# Patient Record
Sex: FEMALE | Race: OTHER | HISPANIC OR LATINO
[De-identification: names, ages, dates, MRNs, and addresses within clinical notes are randomized per-mention and may not be internally consistent; named-entity substitution may affect disease eponyms.]

---

## 2019-11-20 ENCOUNTER — RESULT REVIEW (OUTPATIENT)
Age: 44
End: 2019-11-20

## 2019-12-30 ENCOUNTER — RESULT REVIEW (OUTPATIENT)
Age: 44
End: 2019-12-30

## 2020-03-17 PROBLEM — Z00.00 ENCOUNTER FOR PREVENTIVE HEALTH EXAMINATION: Status: ACTIVE | Noted: 2020-03-17

## 2020-04-14 ENCOUNTER — APPOINTMENT (OUTPATIENT)
Dept: ORTHOPEDIC SURGERY | Facility: CLINIC | Age: 45
End: 2020-04-14

## 2020-06-05 ENCOUNTER — APPOINTMENT (OUTPATIENT)
Dept: ORTHOPEDIC SURGERY | Facility: CLINIC | Age: 45
End: 2020-06-05
Payer: MEDICAID

## 2020-06-05 DIAGNOSIS — M25.552 PAIN IN LEFT HIP: ICD-10-CM

## 2020-06-05 DIAGNOSIS — G89.29 PAIN IN LEFT HIP: ICD-10-CM

## 2020-06-05 DIAGNOSIS — M54.5 LOW BACK PAIN: ICD-10-CM

## 2020-06-05 DIAGNOSIS — Z87.09 PERSONAL HISTORY OF OTHER DISEASES OF THE RESPIRATORY SYSTEM: ICD-10-CM

## 2020-06-05 DIAGNOSIS — G89.29 LOW BACK PAIN: ICD-10-CM

## 2020-06-05 PROCEDURE — 72170 X-RAY EXAM OF PELVIS: CPT

## 2020-06-05 PROCEDURE — 72100 X-RAY EXAM L-S SPINE 2/3 VWS: CPT

## 2020-06-05 PROCEDURE — 99204 OFFICE O/P NEW MOD 45 MIN: CPT

## 2020-06-05 PROCEDURE — 73560 X-RAY EXAM OF KNEE 1 OR 2: CPT | Mod: LT

## 2020-06-05 NOTE — HISTORY OF PRESENT ILLNESS
[de-identified] : Patient is here for left leg pain that began about a year ago without inciting injury. She has experienced falls in the past. The pain goes from the knee to her calf. She has noticed weakness in her legs. She has done nothing to treat this pain. She states that she will occasionally have numbness in the leg. \par \par The patient's past medical history, past surgical history, medications and allergies were reviewed by me today and documented accordingly. In addition, the patient's family and social history, which were noncontributory to this visit, were reviewed also. Intake form was reviewed. The patient has no family history of arthritis.

## 2020-06-05 NOTE — DISCUSSION/SUMMARY
[de-identified] : Patient was seen today for evaluation management of 1+ year of chronic left lower extremity pain.  She has pain from the left side of her back into the left hip, left thigh, and most severely in the left knee.  X-ray evaluation shows that patient has mild osteoarthritis of the lumbar spine, of the left hip, but has severe osteoarthritis in left knee with valgus deformity.  This is the primary etiology of her pain and discomfort leading to abnormal gait and pain going up her kinetic chain.  We discussed various treatment options.  Patient will be started on a course of physical therapy to restore normal range of motion and strength as tolerated.  Patient does not wish to proceed with any oral medications or any injection therapy at this time.  Follow up as needed.  Patient appreciates and agrees with current plan.\par \par This note was generated using dragon medical dictation software.  A reasonable effort has been made for proofreading its contents, but typos may still remain.  If there are any questions or points of clarification needed please notify my office.

## 2020-06-05 NOTE — PHYSICAL EXAM
[de-identified] : Constitutional: Well-nourished, well-developed, No acute distress, obese\par Respiratory:  Good respiratory effort, no SOB\par Lymphatic: No regional lymphadenopathy, no lymphedema\par Psychiatric: Pleasant and normal affect, alert and oriented x3\par Skin: Clean dry and intact B/L UE/LE\par Musculoskeletal: normal except where as noted in regional exam\par \par Lumbar Spine Exam\par \par APPEARANCE: no marked deformities or malalignment, normal curvature of the lumbosacral spine. no marked deformities. good posture\par POSITIVE TENDERNESS: + Left lumbar tenderness and spasm noted in erector spinae and quadratus lumborum\par NONTENDER: no bony midline tenderness, no marked tenderness in right lumbar musculature.\par ROM: full, although painful at end range of flexion and extension\par RESISTIVE TESTING: painless 5/5 resisted flex/ext, sidebending b/l, and rotation\par SPECIAL TESTS: neg SLR b/l, neg MARLEN b/l, neg Trendelenburg b/l \par PULSES: 2+ DP/PT pulses\par NEURO:  L1 - S2 intact to sensation and motor, DTRs 2+/4 patella and achilles\par \par Right hip:\par \par APPEARANCE: no marked deformities, no swelling or malalignment\par POSITIVE TENDERNESS: none\par NONTENDER: greater trochanter, TFL, gluteal region, ischium/proximal hamstring region, hip flexor region, sartorius and pubic symphysis. \par ROM: full & painless. \par RESISTIVE TESTING: painless resisted flex/ext, ER/IR/SLR/abduction/adduction. \par SPECIAL TESTS: neg MARLEN/FADIR, painless loaded flexion & scouring\par PULSES: 2+ DP/PT pulses\par Neuro:  NL sensation of thigh and lower extremity, DTRs 2+/4 patella and achilles\par \par Right knee:\par APPEARANCE: no swelling, no marked deformities or malalignment\par POSITIVE TENDERNESS:  none\par NONTENDER: jt lines b/l & retinacula b/l, patellar & quadriceps tendons, MCL/LCL, ITB at the lateral femoral condyle & Gerdy's tubercle, pes bursa. \par ROM: full & painless. \par RESISTIVE TESTING: painless resisted knee flex/ext. \par SPECIAL TESTS: stable v/v stress. painless grind. neg Lachman's. neg ant/post drawer. neg Sharon's. neg Thessaly test. neg Naida's & Malacrae's\par NEURO: Normal sensation of LE, DTRs 2+/4 patella and achilles\par \par Left hip:\par \par APPEARANCE: no marked deformities, no swelling or malalignment\par POSITIVE TENDERNESS: Hip flexor region, sartorius, proximal rectus femoris\par NONTENDER: greater trochanter, TFL, gluteal region, ischium/proximal hamstring region, and pubic symphysis. \par ROM: Limited in all planes due to pain. \par RESISTIVE TESTING: MMT 4+/5 and mild pain with hip flexion, painless resisted ER/IR/SLR/abduction/adduction. \par SPECIAL TESTS: + MARLEN/FADIR, moderate pain with loaded flexion & scouring, + Trendelenburg\par NEURO: Normal sensation of LE, DTRs 2+/4 patella and achilles\par PULSES: 2+ DP/PT pulses\par \par Left knee:\par APPEARANCE: + enlargement of the distal femur and proximal tibia, valgus deformity, no swelling\par POSITIVE TENDERNESS:  Distal femur, proximal tibia, medial jt line/retinaculum, and lateral jt line/retinaculum\par NONTENDER: patellar & quadriceps tendons, MCL/LCL, ITB at the lateral femoral condyle & Gerdy's tubercle, pes bursa. \par ROM: full extension, limited flexion to 100° due to stiffness and pain. \par RESISTIVE TESTING: painless resisted knee flex/ext, although + crepitus felt in anterior knee. \par SPECIAL TESTS: stable v/v stress. painless grind. neg ant/post drawer. + Sharon's for pain in medial and lateral joint line. \par NEURO: Normal sensation of LE, DTRs 2+/4 patella and achilles\par PULSES: 2+ DP/PT pulses [de-identified] : The following radiographs were ordered and read by me during this patient's visit. I reviewed each radiograph in detail with the patient and discussed the findings as highlighted below. \par \par 2 views of the lumbar spine were obtained today that show degenerative changes and evidence of mild osteoarthritis in the L4-L5 and L5-S1 levels.  No fracture, or dislocation seen at this time. There is no malalignment. No other obvious osseous abnormality. Otherwise unremarkable.\par \par 1 views of the AP pelvis were obtained today that show degenerative changes and evidence of mild osteoarthritis in the bilateral femoral acetabular joints.  No fracture, or dislocation seen at this time. There is no malalignment. No other obvious osseous abnormality. Otherwise unremarkable.\par \par 1 views of the AP knees were obtained today that show degenerative changes and evidence of moderate right and severe left knee osteoarthritis with bone-on-bone contact of the lateral compartment.  No fracture, or dislocation seen at this time. There is no malalignment. No other obvious osseous abnormality. Otherwise unremarkable.

## 2020-09-04 ENCOUNTER — APPOINTMENT (OUTPATIENT)
Dept: ORTHOPEDIC SURGERY | Facility: CLINIC | Age: 45
End: 2020-09-04
Payer: MEDICAID

## 2020-09-04 PROCEDURE — 99213 OFFICE O/P EST LOW 20 MIN: CPT

## 2020-09-04 RX ORDER — HYALURONATE SODIUM, STABILIZED 88 MG/4 ML
88 SYRINGE (ML) INTRAARTICULAR
Qty: 1 | Refills: 0 | Status: ACTIVE | COMMUNITY
Start: 2020-09-04

## 2020-09-04 NOTE — HISTORY OF PRESENT ILLNESS
[de-identified] : Patient is here for left leg pain follow up. She states that she has attended PT but has not noticed improvement. There has been no recent injury. She is taking Tylenol for pain relief.

## 2020-09-04 NOTE — REASON FOR VISIT
[Follow-Up Visit] : a follow-up visit for [Family Member] : family member [FreeTextEntry2] : left leg pain

## 2020-09-04 NOTE — DISCUSSION/SUMMARY
[de-identified] : Patient was seen today for reevaluation and continue management of left knee pain secondary to severe osteoarthritis with bone-on-bone contact of the lateral compartment.  We discussed various treatment options as well as associated risk/benefits/alternatives and patient elected to proceed with conservative management.  Patient would like to proceed with insurance authorization for hyaluronic acid injection therapy at this has the most potential for long-term benefit.  Patient is recommended to start a trial of aquatic therapy, she did not have any response or pain relief to regular table based physical therapy.  Patient appreciates and agrees with current plan.\par \par This note was generated using dragon medical dictation software.  A reasonable effort has been made for proofreading its contents, but typos may still remain.  If there are any questions or points of clarification needed please notify my office.

## 2020-09-04 NOTE — PHYSICAL EXAM
[de-identified] : Constitutional: Well-nourished, well-developed, No acute distress, obese\par Respiratory:  Good respiratory effort, no SOB\par Lymphatic: No regional lymphadenopathy, no lymphedema\par Psychiatric: Pleasant and normal affect, alert and oriented x3\par Skin: Clean dry and intact B/L UE/LE\par Musculoskeletal: normal except where as noted in regional exam\par \par Left knee:\par APPEARANCE: + enlargement of the distal femur and proximal tibia, valgus deformity, no swelling\par POSITIVE TENDERNESS:  Distal femur, proximal tibia, medial jt line/retinaculum, and lateral jt line/retinaculum\par NONTENDER: patellar & quadriceps tendons, MCL/LCL, ITB at the lateral femoral condyle & Gerdy's tubercle, pes bursa. \par ROM: full extension, limited flexion to 100° due to stiffness and pain. \par RESISTIVE TESTING: painless resisted knee flex/ext, although + crepitus felt in anterior knee. \par SPECIAL TESTS: stable v/v stress. painless grind. neg ant/post drawer. + Sharon's for pain in medial and lateral joint line. \par NEURO: Normal sensation of LE\par PULSES: 2+ DP/PT pulses

## 2021-01-18 ENCOUNTER — RESULT REVIEW (OUTPATIENT)
Age: 46
End: 2021-01-18

## 2021-01-20 RX ORDER — HYALURONATE SODIUM, STABILIZED 88 MG/4 ML
88 SYRINGE (ML) INTRAARTICULAR
Qty: 1 | Refills: 0 | Status: ACTIVE | COMMUNITY
Start: 2021-01-20

## 2021-03-03 ENCOUNTER — RESULT REVIEW (OUTPATIENT)
Age: 46
End: 2021-03-03

## 2021-03-05 ENCOUNTER — APPOINTMENT (OUTPATIENT)
Dept: ORTHOPEDIC SURGERY | Facility: CLINIC | Age: 46
End: 2021-03-05

## 2021-03-12 ENCOUNTER — APPOINTMENT (OUTPATIENT)
Dept: ORTHOPEDIC SURGERY | Facility: CLINIC | Age: 46
End: 2021-03-12
Payer: MEDICAID

## 2021-03-12 VITALS — HEIGHT: 66 IN | WEIGHT: 293 LBS | BODY MASS INDEX: 47.09 KG/M2

## 2021-03-12 PROCEDURE — 99072 ADDL SUPL MATRL&STAF TM PHE: CPT

## 2021-03-12 PROCEDURE — 99213 OFFICE O/P EST LOW 20 MIN: CPT

## 2021-03-12 NOTE — PHYSICAL EXAM
[de-identified] : Constitutional: Well-nourished, well-developed, No acute distress, obese\par Respiratory:  Good respiratory effort, no SOB\par Lymphatic: No regional lymphadenopathy, no lymphedema\par Psychiatric: Pleasant and normal affect, alert and oriented x3\par Skin: Clean dry and intact B/L UE/LE\par Musculoskeletal: normal except where as noted in regional exam\par \par Left knee:\par APPEARANCE: + enlargement of the distal femur and proximal tibia, valgus deformity, no swelling\par POSITIVE TENDERNESS:  Distal femur, proximal tibia, medial jt line/retinaculum, and lateral jt line/retinaculum\par NONTENDER: patellar & quadriceps tendons, MCL/LCL, ITB at the lateral femoral condyle & Gerdy's tubercle, pes bursa. \par ROM: full extension, limited flexion to 100° due to stiffness and pain. \par RESISTIVE TESTING: painless resisted knee flex/ext, although + crepitus felt in anterior knee. \par SPECIAL TESTS: stable v/v stress. painless grind. neg ant/post drawer. + Sharon's for pain in medial and lateral joint line. \par

## 2021-03-12 NOTE — HISTORY OF PRESENT ILLNESS
[de-identified] : Patient is here for left knee pain follow up. She has continued having pain. She has attended PT. There has been no recent injury. She was denied insurance coverage of HA.

## 2021-03-24 ENCOUNTER — APPOINTMENT (OUTPATIENT)
Dept: ORTHOPEDIC SURGERY | Facility: CLINIC | Age: 46
End: 2021-03-24
Payer: MEDICAID

## 2021-03-24 VITALS — WEIGHT: 293 LBS | HEIGHT: 66 IN | BODY MASS INDEX: 47.09 KG/M2

## 2021-03-24 PROCEDURE — 99203 OFFICE O/P NEW LOW 30 MIN: CPT | Mod: 25

## 2021-03-24 PROCEDURE — 99072 ADDL SUPL MATRL&STAF TM PHE: CPT

## 2021-03-24 PROCEDURE — 20610 DRAIN/INJ JOINT/BURSA W/O US: CPT | Mod: LT

## 2021-03-24 PROCEDURE — 73562 X-RAY EXAM OF KNEE 3: CPT | Mod: LT

## 2021-04-30 ENCOUNTER — APPOINTMENT (OUTPATIENT)
Dept: ORTHOPEDIC SURGERY | Facility: CLINIC | Age: 46
End: 2021-04-30
Payer: MEDICAID

## 2021-04-30 PROCEDURE — 99072 ADDL SUPL MATRL&STAF TM PHE: CPT

## 2021-04-30 PROCEDURE — 99213 OFFICE O/P EST LOW 20 MIN: CPT

## 2021-04-30 RX ORDER — MELOXICAM 7.5 MG/1
7.5 TABLET ORAL
Qty: 14 | Refills: 0 | Status: ACTIVE | COMMUNITY
Start: 2021-04-30 | End: 1900-01-01

## 2021-04-30 NOTE — PHYSICAL EXAM
[de-identified] : Constitutional: Well-nourished, well-developed, No acute distress, obese\par Respiratory:  Good respiratory effort, no SOB\par Lymphatic: No regional lymphadenopathy, no lymphedema\par Psychiatric: Pleasant and normal affect, alert and oriented x3\par Musculoskeletal: normal except where as noted in regional exam\par \par Right knee:\par APPEARANCE: Mild swelling, no marked deformities or malalignment\par POSITIVE TENDERNESS:  + crepitus of the anterior knee, and tenderness of patellar retinaculum\par NONTENDER: jt lines b/l, patellar & quadriceps tendons, MCL/LCL, ITB at the lateral femoral condyle & Gerdy's tubercle, pes bursa. \par ROM: full Extension, pain with knee flexion, flexion limited to 115 degrees due to stiffness, pain and body habitus\par RESISTIVE TESTING: + discomfort with knee ext from deep knee flexion (stretched position), painless knee flexion. \par SPECIAL TESTS: stable v/v stress. painless grind. neg Lachman's. neg ant/post drawer. neg Sharon's. neg Thessaly test. neg Naida's & Malacrae's\par

## 2021-07-06 ENCOUNTER — APPOINTMENT (OUTPATIENT)
Dept: ORTHOPEDIC SURGERY | Facility: CLINIC | Age: 46
End: 2021-07-06

## 2021-07-06 ENCOUNTER — APPOINTMENT (OUTPATIENT)
Dept: ORTHOPEDIC SURGERY | Facility: CLINIC | Age: 46
End: 2021-07-06
Payer: MEDICAID

## 2021-07-06 PROCEDURE — 73502 X-RAY EXAM HIP UNI 2-3 VIEWS: CPT | Mod: RT

## 2021-07-06 PROCEDURE — 73562 X-RAY EXAM OF KNEE 3: CPT | Mod: LT

## 2021-07-06 PROCEDURE — 20610 DRAIN/INJ JOINT/BURSA W/O US: CPT | Mod: LT

## 2021-07-06 PROCEDURE — 99214 OFFICE O/P EST MOD 30 MIN: CPT | Mod: 25

## 2021-09-14 ENCOUNTER — APPOINTMENT (OUTPATIENT)
Dept: ORTHOPEDIC SURGERY | Facility: CLINIC | Age: 46
End: 2021-09-14
Payer: MEDICAID

## 2021-09-14 VITALS
HEIGHT: 66 IN | BODY MASS INDEX: 47.09 KG/M2 | WEIGHT: 293 LBS | DIASTOLIC BLOOD PRESSURE: 80 MMHG | SYSTOLIC BLOOD PRESSURE: 135 MMHG | OXYGEN SATURATION: 95 % | HEART RATE: 79 BPM

## 2021-09-14 PROCEDURE — 20610 DRAIN/INJ JOINT/BURSA W/O US: CPT | Mod: LT

## 2021-09-14 PROCEDURE — 99214 OFFICE O/P EST MOD 30 MIN: CPT | Mod: 25

## 2021-09-14 NOTE — PHYSICAL EXAM
[Antalgic] : antalgic [Normal RUE] : Right Upper Extremity: No scars, rashes, lesions, ulcers, skin intact [Normal Finger/nose] : finger to nose coordination [Normal Touch] : sensation intact for touch [Obese] : obese [Normal] : no peripheral adenopathy appreciated [Poor Appearance] : well-appearing [Acute Distress] : not in acute distress [de-identified] : Patient appears stated age in no acute respiratory distress. Patient is alert oriented x3. Patient has normal mood and affect. \par Right knee exam\par Range of motion of the knee is 0-120°. \par Skin is normal.  No rash.\par There is no effusion. No medial or lateral joint line tenderness. No swelling, no pitting edema.\par Overall alignment of the knee is then slight varus. Good anterior posterior stability. Firm endpoints on anterior and posterior drawer. \par Medial lateral stability is intact. Firm endpoint on medial and lateral stress testing .\par Sharon test is negative.\par Quadriceps strength 5/ 5. There is no loss of muscle volume in the thigh. \par Good anterior posterior and mediolateral stability.\par Sensation in the extremities intact. \par Discrimination is intact. Good DP and PT pulses.\par 		\par \par Bilateral hip exam\par On inspection of the hip shows skin is normal. No evidence of rash. \par No loss of muscle.  Abductor strength is 5 out of 5. Hip flexor strength is 5.\par Range of motion of the hip at 90° flexion internal rotation is 15° external rotation is 30° pain-free. \par Hip has good stability in anterior and posterior direction. \par On lateral decubitus  examination there is no tenderness in the greater trochanter. \par Lower Extremity Examination \par Bilateral lower extremity skin is normal. There is no rash. There is no edema and lymphadenopathy.  DP and PT pulses intact. Sensation is intact.\par \par Left knee exam\par There is minimal to moderate effusion. Range of motion is 0-120°. Painful at the extremes of range of motion. \par There is medial and lateral joint line tenderness. \par Quadriceps strength is 4/5. There is some muscle loss in the quadriceps. \par Anteroposterior and mediolateral stability is intact Sharon test is negative. Alignment of the knee is in 5° of varus. [de-identified] : X-rays of the left knee 3 view shows advanced degenerative bone-on-bone arthritis joint destruction osteophyte formation\par \par X-rays of the right hip 2 view shows mild arthritis
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
## 2021-09-14 NOTE — DISCUSSION/SUMMARY
[Surgical risks reviewed] : Surgical risks reviewed [de-identified] : Left knee arthritis bone-on-bone arthritis joint destruction osteophyte formation\par At this point patient has tried all conservative treatment options including weight loss NSAIDs. Patient also has had multiple injections in the knee. This has not helped the patient. Patient is considering surgical treatment options. All the pros and cons of operative versus nonoperative treatment discussed with the patient. All of the potential risks including possible infection possible bleeding possible need for repeat surgery possible medical complications possible blood clots discussed with the patient. Patient completely understands the risks and benefits.\par The risks of re-admission discussed with the patient as well. Also the need for anticoagulation discussed with the patient. Patient prefers to take aspirin 325 twice a day.\par Patient completely understands the risks and benefits and wants to undergo the procedure.\par I reviewed the plan of care as well as used a model of a total knee implant equivalent to the one that will be used for their total knee  joint replacement. The ability to secure the implant utilizing cement or cementless (press-fit) was discussed with the patient. The patient agrees with the plan of care, as well as the use of implants for their total joint replacement.\par The patient is advised to get medical clearance. \par Patient is also advised to attend a joint replacement education class.\par \par For now we will try conservative treatment NSAIDs therapy she is considering left knee injection\par The risks and benefits of injection was discussed with the patient. Verbal consent was obtained from the patient. The area was prepped with Betadine. A lateral suprapatellar approach was used. The knee was injected with 2 cc of Depo-Medrol 2 cc of lidocaine.  The procedure well. Band-Aid was applied.

## 2022-04-26 ENCOUNTER — APPOINTMENT (OUTPATIENT)
Dept: ORTHOPEDIC SURGERY | Facility: CLINIC | Age: 47
End: 2022-04-26

## 2022-06-17 ENCOUNTER — APPOINTMENT (OUTPATIENT)
Dept: ORTHOPEDIC SURGERY | Facility: CLINIC | Age: 47
End: 2022-06-17
Payer: MEDICAID

## 2022-06-17 VITALS — WEIGHT: 293 LBS | BODY MASS INDEX: 47.09 KG/M2 | HEIGHT: 66 IN

## 2022-06-17 DIAGNOSIS — M76.822 POSTERIOR TIBIAL TENDINITIS, LEFT LEG: ICD-10-CM

## 2022-06-17 PROCEDURE — 73630 X-RAY EXAM OF FOOT: CPT | Mod: LT

## 2022-06-17 PROCEDURE — 99214 OFFICE O/P EST MOD 30 MIN: CPT

## 2022-06-17 PROCEDURE — 73564 X-RAY EXAM KNEE 4 OR MORE: CPT | Mod: LT

## 2022-06-17 NOTE — PHYSICAL EXAM
[de-identified] : Right Lower Extremity\par o Knee :\par ¦ Inspection/Palpation : no tenderness to palpation, no swelling, no deformity\par ¦ Range of Motion : 0 - 110 degrees, no crepitus\par ¦ Stability : no valgus or varus instability present on provocative testing, Lachman’s Test (-)\par ¦ Strength : flexion and extension 5/5\par o Muscle Bulk : normal muscle bulk present\par o Skin : no erythema, no ecchymosis\par o Sensation : sensation to pin intact\par o Vascular Exam : no edema, no cyanosis, dorsalis pedis artery pulse 2+, posterior tibial artery pulse 2+\par \par Left Lower Extremity\par o Knee :\par ¦ Inspection/Palpation : no tenderness to palpation, no swelling, valgus alignment\par ¦ Range of Motion : 0 -100 degrees, no crepitus\par ¦ Stability : no valgus or varus instability present on provocative testing, Lachman’s Test (-)\par ¦ Strength : flexion and extension 5/5\par o Muscle Bulk : normal muscle bulk present\par o Skin : no erythema, no ecchymosis\par o Sensation : sensation to pin intact\par o Vascular Exam : no edema, no cyanosis, dorsalis pedis artery pulse 2+, posterior tibial artery pulse 2+ \par \par o Ankle :\par ¦ Inspection/Palpation : tenderness to palpation distal posterior tibialis, tenderness over the navicular, no swelling, no deformities\par ¦ Range of Motion : arc of motion full and painless in all planes\par ¦ Stability : no joint instability on provocative testing\par ¦ Strength : all muscles 5/5\par o Muscle Bulk : no atrophy\par o Sensation : sensation intact to light touch\par o Skin : no skin lesions, no discoloration\par o Vascular Exam : no edema, no cyanosis,  dorsalis pedis artery pulse 2+, posterior tibial artery pulse 2+  [de-identified] : \par \par o Left Knee : AP, lateral, sunrise, and Souza views of the knee were obtained, there are no soft tissue abnormalities, no fractures, valgus alignment, severe osteoarthritis with bone on bone apposition in the lateral compartment, normal bone density, no bony lesions. \par \par o Left Foot: AP, lateral and oblique views of the foot were obtained, there are no soft tissue abnormalities, no fractures, alignment is normal, normal appearing joint spaces, mild spurring dorsal aspect of the talonavicular joint, normal bone density, no bony lesions.
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
## 2022-06-17 NOTE — DISCUSSION/SUMMARY
[de-identified] : The underlying pathophysiology was reviewed in great detail with the patient as well as the various treatment options, including ice, analgesics, NSAIDs, Physical therapy, steroid injections.\par \par I recommended a course of physical therapy for the bilateral knees to improve ROM and strength. A script was provided today. \par \par Activity modifications and restrictions were discussed. I advised avoiding deep bending, squatting and high intensity activity.\par \par A home exercise sheet was given and discussed with the patient to follow. \par \par I have recommended utilizing a knee sleeve to provide added support and stability. \par \par All questions were answered, all alternatives discussed and the patient is in complete agreement with that plan. Follow-up appointment as instructed. Any issues and the patient will call or come in sooner.

## 2022-08-24 NOTE — DISCUSSION/SUMMARY
[de-identified] : Discussed findings of today's exam and possible causes of patient's pain.  Educated patient on their most probable diagnosis of atraumatic right knee pain due to exacerbation of underlying osteoarthritis.  Reviewed possible courses of treatment, and we collaboratively decided best course of treatment at this time will include conservative management.  Patient was seen today for evaluation of 1 day of right knee pain without any trauma, she has some mild swelling, but no significant effusion.  Patient likely has exacerbation of underlying osteoarthritis.  Patient started on a course of oral NSAIDs, prescription given for Mobic (We discussed all possible side effects of this medication).  Patient is already undergoing a course of physical therapy for her left knee, recommend addition of this right knee to that course of PT.  If no improvement in the next couple of weeks may consider cortisone injection at that time.  Follow up as needed.  Patient appreciates and agrees with current plan.\par \par This note was generated using dragon medical dictation software.  A reasonable effort has been made for proofreading its contents, but typos may still remain.  If there are any questions or points of clarification needed please notify my office.\par 
54

## 2022-11-28 ENCOUNTER — RESULT REVIEW (OUTPATIENT)
Age: 47
End: 2022-11-28

## 2023-05-09 ENCOUNTER — APPOINTMENT (OUTPATIENT)
Dept: ORTHOPEDIC SURGERY | Facility: CLINIC | Age: 48
End: 2023-05-09
Payer: MEDICAID

## 2023-05-09 ENCOUNTER — APPOINTMENT (OUTPATIENT)
Dept: ORTHOPEDIC SURGERY | Facility: CLINIC | Age: 48
End: 2023-05-09

## 2023-05-09 VITALS — HEIGHT: 67 IN | BODY MASS INDEX: 45.36 KG/M2 | WEIGHT: 289 LBS

## 2023-05-09 DIAGNOSIS — G40.909 EPILEPSY, UNSPECIFIED, NOT INTRACTABLE, W/OUT STATUS EPILEPTICUS: ICD-10-CM

## 2023-05-09 DIAGNOSIS — M79.672 PAIN IN LEFT FOOT: ICD-10-CM

## 2023-05-09 PROCEDURE — 73564 X-RAY EXAM KNEE 4 OR MORE: CPT | Mod: LT

## 2023-05-09 PROCEDURE — 73630 X-RAY EXAM OF FOOT: CPT | Mod: LT

## 2023-05-09 PROCEDURE — 99214 OFFICE O/P EST MOD 30 MIN: CPT

## 2023-10-03 ENCOUNTER — APPOINTMENT (OUTPATIENT)
Dept: ORTHOPEDIC SURGERY | Facility: CLINIC | Age: 48
End: 2023-10-03

## 2023-11-28 ENCOUNTER — APPOINTMENT (OUTPATIENT)
Dept: ORTHOPEDIC SURGERY | Facility: CLINIC | Age: 48
End: 2023-11-28
Payer: MEDICAID

## 2023-11-28 VITALS — HEIGHT: 67 IN | WEIGHT: 289 LBS | BODY MASS INDEX: 45.36 KG/M2

## 2023-11-28 PROCEDURE — 99214 OFFICE O/P EST MOD 30 MIN: CPT | Mod: 25

## 2023-11-28 PROCEDURE — 20610 DRAIN/INJ JOINT/BURSA W/O US: CPT | Mod: LT

## 2024-01-26 ENCOUNTER — APPOINTMENT (OUTPATIENT)
Dept: ORTHOPEDIC SURGERY | Facility: CLINIC | Age: 49
End: 2024-01-26

## 2024-03-06 ENCOUNTER — APPOINTMENT (OUTPATIENT)
Dept: ORTHOPEDIC SURGERY | Facility: CLINIC | Age: 49
End: 2024-03-06
Payer: MEDICAID

## 2024-03-06 DIAGNOSIS — M17.11 UNILATERAL PRIMARY OSTEOARTHRITIS, RIGHT KNEE: ICD-10-CM

## 2024-03-06 PROCEDURE — 99214 OFFICE O/P EST MOD 30 MIN: CPT | Mod: 25

## 2024-03-06 PROCEDURE — 73564 X-RAY EXAM KNEE 4 OR MORE: CPT | Mod: LT

## 2024-05-08 ENCOUNTER — APPOINTMENT (OUTPATIENT)
Dept: ORTHOPEDIC SURGERY | Facility: CLINIC | Age: 49
End: 2024-05-08
Payer: MEDICAID

## 2024-05-08 VITALS — BODY MASS INDEX: 45.36 KG/M2 | WEIGHT: 289 LBS | HEIGHT: 67 IN

## 2024-05-08 DIAGNOSIS — M17.12 UNILATERAL PRIMARY OSTEOARTHRITIS, LEFT KNEE: ICD-10-CM

## 2024-05-08 PROCEDURE — 99214 OFFICE O/P EST MOD 30 MIN: CPT

## 2024-06-20 ENCOUNTER — OUTPATIENT (OUTPATIENT)
Dept: OUTPATIENT SERVICES | Facility: HOSPITAL | Age: 49
LOS: 1 days | End: 2024-06-20
Payer: MEDICAID

## 2024-06-20 VITALS
RESPIRATION RATE: 14 BRPM | HEIGHT: 68.5 IN | DIASTOLIC BLOOD PRESSURE: 85 MMHG | OXYGEN SATURATION: 97 % | SYSTOLIC BLOOD PRESSURE: 139 MMHG | HEART RATE: 69 BPM | TEMPERATURE: 98 F | WEIGHT: 270.95 LBS

## 2024-06-20 DIAGNOSIS — M17.12 UNILATERAL PRIMARY OSTEOARTHRITIS, LEFT KNEE: ICD-10-CM

## 2024-06-20 DIAGNOSIS — Z01.818 ENCOUNTER FOR OTHER PREPROCEDURAL EXAMINATION: ICD-10-CM

## 2024-06-20 DIAGNOSIS — Z98.890 OTHER SPECIFIED POSTPROCEDURAL STATES: Chronic | ICD-10-CM

## 2024-06-20 LAB
A1C WITH ESTIMATED AVERAGE GLUCOSE RESULT: 5.3 % — SIGNIFICANT CHANGE UP (ref 4–5.6)
ALBUMIN SERPL ELPH-MCNC: 3.7 G/DL — SIGNIFICANT CHANGE UP (ref 3.3–5)
ALP SERPL-CCNC: 112 U/L — SIGNIFICANT CHANGE UP (ref 30–120)
ALT FLD-CCNC: 21 U/L — SIGNIFICANT CHANGE UP (ref 10–60)
ANION GAP SERPL CALC-SCNC: 10 MMOL/L — SIGNIFICANT CHANGE UP (ref 5–17)
APTT BLD: 35.1 SEC — SIGNIFICANT CHANGE UP (ref 24.5–35.6)
AST SERPL-CCNC: 17 U/L — SIGNIFICANT CHANGE UP (ref 10–40)
BILIRUB SERPL-MCNC: 0.3 MG/DL — SIGNIFICANT CHANGE UP (ref 0.2–1.2)
BLD GP AB SCN SERPL QL: SIGNIFICANT CHANGE UP
BUN SERPL-MCNC: 19 MG/DL — SIGNIFICANT CHANGE UP (ref 7–23)
CALCIUM SERPL-MCNC: 8.9 MG/DL — SIGNIFICANT CHANGE UP (ref 8.4–10.5)
CHLORIDE SERPL-SCNC: 105 MMOL/L — SIGNIFICANT CHANGE UP (ref 96–108)
CO2 SERPL-SCNC: 24 MMOL/L — SIGNIFICANT CHANGE UP (ref 22–31)
CREAT SERPL-MCNC: 0.81 MG/DL — SIGNIFICANT CHANGE UP (ref 0.5–1.3)
EGFR: 91 ML/MIN/1.73M2 — SIGNIFICANT CHANGE UP
ESTIMATED AVERAGE GLUCOSE: 105 MG/DL — SIGNIFICANT CHANGE UP (ref 68–114)
GLUCOSE SERPL-MCNC: 92 MG/DL — SIGNIFICANT CHANGE UP (ref 70–99)
HCT VFR BLD CALC: 35.9 % — SIGNIFICANT CHANGE UP (ref 34.5–45)
HCV AB S/CO SERPL IA: 0.09 S/CO — SIGNIFICANT CHANGE UP (ref 0–0.99)
HCV AB SERPL-IMP: SIGNIFICANT CHANGE UP
HGB BLD-MCNC: 11.4 G/DL — LOW (ref 11.5–15.5)
INR BLD: 1.02 RATIO — SIGNIFICANT CHANGE UP (ref 0.85–1.18)
MCHC RBC-ENTMCNC: 28.8 PG — SIGNIFICANT CHANGE UP (ref 27–34)
MCHC RBC-ENTMCNC: 31.8 GM/DL — LOW (ref 32–36)
MCV RBC AUTO: 90.7 FL — SIGNIFICANT CHANGE UP (ref 80–100)
MRSA PCR RESULT.: SIGNIFICANT CHANGE UP
NRBC # BLD: 0 /100 WBCS — SIGNIFICANT CHANGE UP (ref 0–0)
PLATELET # BLD AUTO: 304 K/UL — SIGNIFICANT CHANGE UP (ref 150–400)
POTASSIUM SERPL-MCNC: 3.7 MMOL/L — SIGNIFICANT CHANGE UP (ref 3.5–5.3)
POTASSIUM SERPL-SCNC: 3.7 MMOL/L — SIGNIFICANT CHANGE UP (ref 3.5–5.3)
PROT SERPL-MCNC: 8.2 G/DL — SIGNIFICANT CHANGE UP (ref 6–8.3)
PROTHROM AB SERPL-ACNC: 11.4 SEC — SIGNIFICANT CHANGE UP (ref 9.5–13)
RBC # BLD: 3.96 M/UL — SIGNIFICANT CHANGE UP (ref 3.8–5.2)
RBC # FLD: 13.5 % — SIGNIFICANT CHANGE UP (ref 10.3–14.5)
S AUREUS DNA NOSE QL NAA+PROBE: DETECTED
SODIUM SERPL-SCNC: 139 MMOL/L — SIGNIFICANT CHANGE UP (ref 135–145)
WBC # BLD: 6.97 K/UL — SIGNIFICANT CHANGE UP (ref 3.8–10.5)
WBC # FLD AUTO: 6.97 K/UL — SIGNIFICANT CHANGE UP (ref 3.8–10.5)

## 2024-06-20 PROCEDURE — 85730 THROMBOPLASTIN TIME PARTIAL: CPT

## 2024-06-20 PROCEDURE — G0463: CPT

## 2024-06-20 PROCEDURE — 85610 PROTHROMBIN TIME: CPT

## 2024-06-20 PROCEDURE — 93010 ELECTROCARDIOGRAM REPORT: CPT

## 2024-06-20 PROCEDURE — 86900 BLOOD TYPING SEROLOGIC ABO: CPT

## 2024-06-20 PROCEDURE — 86850 RBC ANTIBODY SCREEN: CPT

## 2024-06-20 PROCEDURE — 36415 COLL VENOUS BLD VENIPUNCTURE: CPT

## 2024-06-20 PROCEDURE — 87640 STAPH A DNA AMP PROBE: CPT

## 2024-06-20 PROCEDURE — 87641 MR-STAPH DNA AMP PROBE: CPT

## 2024-06-20 PROCEDURE — 93005 ELECTROCARDIOGRAM TRACING: CPT

## 2024-06-20 PROCEDURE — 83036 HEMOGLOBIN GLYCOSYLATED A1C: CPT

## 2024-06-20 PROCEDURE — 86803 HEPATITIS C AB TEST: CPT

## 2024-06-20 PROCEDURE — 86901 BLOOD TYPING SEROLOGIC RH(D): CPT

## 2024-06-20 PROCEDURE — 80053 COMPREHEN METABOLIC PANEL: CPT

## 2024-06-20 PROCEDURE — 85027 COMPLETE CBC AUTOMATED: CPT

## 2024-06-21 RX ORDER — MUPIROCIN 20 MG/G
1 OINTMENT TOPICAL
Qty: 1 | Refills: 0
Start: 2024-06-21 | End: 2024-06-25

## 2024-07-22 PROBLEM — F80.1 EXPRESSIVE LANGUAGE DISORDER: Chronic | Status: ACTIVE | Noted: 2024-06-20

## 2024-07-22 PROBLEM — Z92.29 PERSONAL HISTORY OF OTHER DRUG THERAPY: Chronic | Status: ACTIVE | Noted: 2024-06-20

## 2024-07-22 PROBLEM — Z86.16 PERSONAL HISTORY OF COVID-19: Chronic | Status: ACTIVE | Noted: 2024-06-20

## 2024-07-22 PROBLEM — N92.0 EXCESSIVE AND FREQUENT MENSTRUATION WITH REGULAR CYCLE: Chronic | Status: ACTIVE | Noted: 2024-06-20

## 2024-07-22 PROBLEM — M17.12 UNILATERAL PRIMARY OSTEOARTHRITIS, LEFT KNEE: Chronic | Status: ACTIVE | Noted: 2024-06-20

## 2024-07-22 PROBLEM — Z87.42 PERSONAL HISTORY OF OTHER DISEASES OF THE FEMALE GENITAL TRACT: Chronic | Status: ACTIVE | Noted: 2024-06-20

## 2024-07-22 PROBLEM — J45.909 UNSPECIFIED ASTHMA, UNCOMPLICATED: Chronic | Status: ACTIVE | Noted: 2024-06-20

## 2024-07-22 PROBLEM — Z86.69 PERSONAL HISTORY OF OTHER DISEASES OF THE NERVOUS SYSTEM AND SENSE ORGANS: Chronic | Status: ACTIVE | Noted: 2024-06-20

## 2024-07-22 PROBLEM — G43.909 MIGRAINE, UNSPECIFIED, NOT INTRACTABLE, WITHOUT STATUS MIGRAINOSUS: Chronic | Status: ACTIVE | Noted: 2024-06-20

## 2024-07-22 PROBLEM — R79.89 OTHER SPECIFIED ABNORMAL FINDINGS OF BLOOD CHEMISTRY: Chronic | Status: ACTIVE | Noted: 2024-06-20

## 2024-07-22 PROBLEM — G40.909 EPILEPSY, UNSPECIFIED, NOT INTRACTABLE, WITHOUT STATUS EPILEPTICUS: Chronic | Status: ACTIVE | Noted: 2024-06-20

## 2024-07-22 PROBLEM — I10 ESSENTIAL (PRIMARY) HYPERTENSION: Chronic | Status: ACTIVE | Noted: 2024-06-20

## 2024-07-22 PROBLEM — E66.01 MORBID (SEVERE) OBESITY DUE TO EXCESS CALORIES: Chronic | Status: ACTIVE | Noted: 2024-06-20

## 2024-07-22 RX ORDER — TOPIRAMATE 50 MG/1
1 TABLET, FILM COATED ORAL
Refills: 0 | DISCHARGE

## 2024-07-22 RX ORDER — LACOSAMIDE 100 MG/1
1 TABLET, FILM COATED ORAL
Refills: 0 | DISCHARGE

## 2024-07-22 RX ORDER — NORTRIPTYLINE HCL 25 MG
3 CAPSULE ORAL
Refills: 0 | DISCHARGE

## 2024-07-22 RX ORDER — AMLODIPINE BESYLATE 2.5 MG/1
1 TABLET ORAL
Refills: 0 | DISCHARGE

## 2024-07-22 RX ORDER — ALBUTEROL 90 MCG
2 AEROSOL REFILL (GRAM) INHALATION
Refills: 0 | DISCHARGE

## 2024-07-22 RX ORDER — CARBAMAZEPINE 200 MG/1
1 TABLET ORAL
Refills: 0 | DISCHARGE

## 2024-08-05 ENCOUNTER — TRANSCRIPTION ENCOUNTER (OUTPATIENT)
Age: 49
End: 2024-08-05

## 2024-08-06 ENCOUNTER — INPATIENT (INPATIENT)
Facility: HOSPITAL | Age: 49
LOS: 2 days | Discharge: NURSING, REHAB OR LONG-TERM CARE | DRG: 554 | End: 2024-08-09
Attending: ORTHOPAEDIC SURGERY | Admitting: ORTHOPAEDIC SURGERY
Payer: MEDICAID

## 2024-08-06 ENCOUNTER — TRANSCRIPTION ENCOUNTER (OUTPATIENT)
Age: 49
End: 2024-08-06

## 2024-08-06 ENCOUNTER — APPOINTMENT (OUTPATIENT)
Dept: ORTHOPEDIC SURGERY | Facility: HOSPITAL | Age: 49
End: 2024-08-06

## 2024-08-06 VITALS
HEART RATE: 74 BPM | HEIGHT: 67 IN | TEMPERATURE: 98 F | OXYGEN SATURATION: 100 % | WEIGHT: 266.76 LBS | RESPIRATION RATE: 21 BRPM

## 2024-08-06 DIAGNOSIS — M17.12 UNILATERAL PRIMARY OSTEOARTHRITIS, LEFT KNEE: ICD-10-CM

## 2024-08-06 DIAGNOSIS — Z98.890 OTHER SPECIFIED POSTPROCEDURAL STATES: Chronic | ICD-10-CM

## 2024-08-06 LAB — HCG UR QL: NEGATIVE — SIGNIFICANT CHANGE UP

## 2024-08-06 PROCEDURE — 99223 1ST HOSP IP/OBS HIGH 75: CPT

## 2024-08-06 PROCEDURE — 73560 X-RAY EXAM OF KNEE 1 OR 2: CPT | Mod: 26,LT

## 2024-08-06 PROCEDURE — S2900 ROBOTIC SURGICAL SYSTEM: CPT | Mod: NC

## 2024-08-06 PROCEDURE — 20985 CPTR-ASST DIR MS PX: CPT

## 2024-08-06 PROCEDURE — 27447 TOTAL KNEE ARTHROPLASTY: CPT | Mod: LT

## 2024-08-06 PROCEDURE — 27447 TOTAL KNEE ARTHROPLASTY: CPT | Mod: AS,LT

## 2024-08-06 DEVICE — STEM TIB PSN SZ E L 5 DEG: Type: IMPLANTABLE DEVICE | Site: LEFT | Status: FUNCTIONAL

## 2024-08-06 DEVICE — FEM PERSONA PS CMT CCR STD SZ9 L: Type: IMPLANTABLE DEVICE | Site: LEFT | Status: FUNCTIONAL

## 2024-08-06 DEVICE — ZIMMER/NEXGEN SMOOTH PIN 3.2X75MM: Type: IMPLANTABLE DEVICE | Site: LEFT | Status: FUNCTIONAL

## 2024-08-06 DEVICE — PIN FIX CAS 3.2X80MM STR: Type: IMPLANTABLE DEVICE | Site: LEFT | Status: FUNCTIONAL

## 2024-08-06 DEVICE — ZIMMER FEMALE HEX SCREW MAGNETIC 2.5MM X 25MM: Type: IMPLANTABLE DEVICE | Site: LEFT | Status: FUNCTIONAL

## 2024-08-06 DEVICE — SCREW HEX HEADED 3.5X48MM: Type: IMPLANTABLE DEVICE | Site: LEFT | Status: FUNCTIONAL

## 2024-08-06 DEVICE — SURF ART PERSONA LT 6-9EF 12MM: Type: IMPLANTABLE DEVICE | Site: LEFT | Status: FUNCTIONAL

## 2024-08-06 DEVICE — IMPLANTABLE DEVICE: Type: IMPLANTABLE DEVICE | Site: LEFT | Status: FUNCTIONAL

## 2024-08-06 DEVICE — PATELLA  ALL POLY VE 32MM: Type: IMPLANTABLE DEVICE | Site: LEFT | Status: FUNCTIONAL

## 2024-08-06 DEVICE — PIN CAS FIX 3.2X150MM: Type: IMPLANTABLE DEVICE | Site: LEFT | Status: FUNCTIONAL

## 2024-08-06 RX ORDER — ONDANSETRON HCL/PF 4 MG/2 ML
4 VIAL (ML) INJECTION EVERY 6 HOURS
Refills: 0 | Status: DISCONTINUED | OUTPATIENT
Start: 2024-08-06 | End: 2024-08-09

## 2024-08-06 RX ORDER — CARBAMAZEPINE 300 MG/1
400 CAPSULE, EXTENDED RELEASE ORAL
Refills: 0 | Status: DISCONTINUED | OUTPATIENT
Start: 2024-08-06 | End: 2024-08-09

## 2024-08-06 RX ORDER — ONDANSETRON HCL/PF 4 MG/2 ML
4 VIAL (ML) INJECTION ONCE
Refills: 0 | Status: DISCONTINUED | OUTPATIENT
Start: 2024-08-06 | End: 2024-08-06

## 2024-08-06 RX ORDER — PANTOPRAZOLE SODIUM 20 MG/1
40 TABLET, DELAYED RELEASE ORAL
Refills: 0 | Status: DISCONTINUED | OUTPATIENT
Start: 2024-08-06 | End: 2024-08-09

## 2024-08-06 RX ORDER — ASPIRIN 500 MG
81 TABLET ORAL EVERY 12 HOURS
Refills: 0 | Status: DISCONTINUED | OUTPATIENT
Start: 2024-08-07 | End: 2024-08-07

## 2024-08-06 RX ORDER — CEFAZOLIN SODIUM 10 G
3000 VIAL (EA) INJECTION ONCE
Refills: 0 | Status: COMPLETED | OUTPATIENT
Start: 2024-08-06 | End: 2024-08-06

## 2024-08-06 RX ORDER — ACETAMINOPHEN 500 MG
1000 TABLET ORAL ONCE
Refills: 0 | Status: COMPLETED | OUTPATIENT
Start: 2024-08-06 | End: 2024-08-06

## 2024-08-06 RX ORDER — TRANEXAMIC ACID 650 MG/1
1000 TABLET ORAL ONCE
Refills: 0 | Status: COMPLETED | OUTPATIENT
Start: 2024-08-06 | End: 2024-08-06

## 2024-08-06 RX ORDER — BACTERIOSTATIC SODIUM CHLORIDE 0.9 %
500 VIAL (ML) INJECTION ONCE
Refills: 0 | Status: COMPLETED | OUTPATIENT
Start: 2024-08-06 | End: 2024-08-06

## 2024-08-06 RX ORDER — CELECOXIB 200 MG/1
200 CAPSULE ORAL EVERY 12 HOURS
Refills: 0 | Status: DISCONTINUED | OUTPATIENT
Start: 2024-08-07 | End: 2024-08-09

## 2024-08-06 RX ORDER — MAGNESIUM HYDROXIDE 400 MG/5ML
30 SUSPENSION, ORAL (FINAL DOSE FORM) ORAL DAILY
Refills: 0 | Status: DISCONTINUED | OUTPATIENT
Start: 2024-08-06 | End: 2024-08-09

## 2024-08-06 RX ORDER — ALBUTEROL 90 MCG
2 AEROSOL REFILL (GRAM) INHALATION EVERY 6 HOURS
Refills: 0 | Status: DISCONTINUED | OUTPATIENT
Start: 2024-08-06 | End: 2024-08-09

## 2024-08-06 RX ORDER — NORTRIPTYLINE HCL 75 MG
30 CAPSULE ORAL AT BEDTIME
Refills: 0 | Status: DISCONTINUED | OUTPATIENT
Start: 2024-08-06 | End: 2024-08-09

## 2024-08-06 RX ORDER — OXYCODONE HYDROCHLORIDE 30 MG/1
10 TABLET ORAL
Refills: 0 | Status: DISCONTINUED | OUTPATIENT
Start: 2024-08-06 | End: 2024-08-07

## 2024-08-06 RX ORDER — LACOSAMIDE 50 MG/1
200 TABLET, FILM COATED ORAL
Refills: 0 | Status: DISCONTINUED | OUTPATIENT
Start: 2024-08-06 | End: 2024-08-09

## 2024-08-06 RX ORDER — CEFAZOLIN SODIUM 10 G
2000 VIAL (EA) INJECTION EVERY 8 HOURS
Refills: 0 | Status: COMPLETED | OUTPATIENT
Start: 2024-08-06 | End: 2024-08-07

## 2024-08-06 RX ORDER — HYDROMORPHONE HCL IN 0.9% NACL 0.2 MG/ML
0.5 PLASTIC BAG, INJECTION (ML) INTRAVENOUS
Refills: 0 | Status: DISCONTINUED | OUTPATIENT
Start: 2024-08-06 | End: 2024-08-06

## 2024-08-06 RX ORDER — ACETAMINOPHEN 500 MG
1000 TABLET ORAL EVERY 8 HOURS
Refills: 0 | Status: DISCONTINUED | OUTPATIENT
Start: 2024-08-06 | End: 2024-08-09

## 2024-08-06 RX ORDER — OXYCODONE HYDROCHLORIDE 30 MG/1
5 TABLET ORAL
Refills: 0 | Status: DISCONTINUED | OUTPATIENT
Start: 2024-08-06 | End: 2024-08-07

## 2024-08-06 RX ORDER — TOPIRAMATE 100 MG/1
200 TABLET ORAL
Refills: 0 | Status: DISCONTINUED | OUTPATIENT
Start: 2024-08-06 | End: 2024-08-09

## 2024-08-06 RX ORDER — APREPITANT 40 MG
40 CAPSULE ORAL ONCE
Refills: 0 | Status: COMPLETED | OUTPATIENT
Start: 2024-08-06 | End: 2024-08-06

## 2024-08-06 RX ORDER — SENNOSIDES 8.6 MG/1
2 TABLET ORAL AT BEDTIME
Refills: 0 | Status: DISCONTINUED | OUTPATIENT
Start: 2024-08-06 | End: 2024-08-09

## 2024-08-06 RX ORDER — DEXTROSE MONOHYDRATE, SODIUM CHLORIDE, SODIUM LACTATE, CALCIUM CHLORIDE, MAGNESIUM CHLORIDE 1.5; 538; 448; 18.4; 5.08 G/100ML; MG/100ML; MG/100ML; MG/100ML; MG/100ML
1000 SOLUTION INTRAPERITONEAL
Refills: 0 | Status: DISCONTINUED | OUTPATIENT
Start: 2024-08-06 | End: 2024-08-06

## 2024-08-06 RX ORDER — CHLORHEXIDINE GLUCONATE 500 MG/1
1 CLOTH TOPICAL ONCE
Refills: 0 | Status: COMPLETED | OUTPATIENT
Start: 2024-08-06 | End: 2024-08-06

## 2024-08-06 RX ORDER — DEXTROSE MONOHYDRATE, SODIUM CHLORIDE, SODIUM LACTATE, CALCIUM CHLORIDE, MAGNESIUM CHLORIDE 1.5; 538; 448; 18.4; 5.08 G/100ML; MG/100ML; MG/100ML; MG/100ML; MG/100ML
1000 SOLUTION INTRAPERITONEAL
Refills: 0 | Status: DISCONTINUED | OUTPATIENT
Start: 2024-08-06 | End: 2024-08-09

## 2024-08-06 RX ORDER — HYDROMORPHONE HCL IN 0.9% NACL 0.2 MG/ML
0.5 PLASTIC BAG, INJECTION (ML) INTRAVENOUS
Refills: 0 | Status: DISCONTINUED | OUTPATIENT
Start: 2024-08-06 | End: 2024-08-09

## 2024-08-06 RX ORDER — AMLODIPINE BESYLATE 2.5 MG/1
5 TABLET ORAL DAILY
Refills: 0 | Status: DISCONTINUED | OUTPATIENT
Start: 2024-08-08 | End: 2024-08-09

## 2024-08-06 RX ORDER — DEXAMETHASONE 1.5 MG/1
8 TABLET ORAL ONCE
Refills: 0 | Status: COMPLETED | OUTPATIENT
Start: 2024-08-07 | End: 2024-08-07

## 2024-08-06 RX ORDER — HYDROMORPHONE HCL IN 0.9% NACL 0.2 MG/ML
0.2 PLASTIC BAG, INJECTION (ML) INTRAVENOUS
Refills: 0 | Status: DISCONTINUED | OUTPATIENT
Start: 2024-08-06 | End: 2024-08-06

## 2024-08-06 RX ORDER — LORATADINE 10 MG
17 TABLET,DISINTEGRATING ORAL AT BEDTIME
Refills: 0 | Status: DISCONTINUED | OUTPATIENT
Start: 2024-08-06 | End: 2024-08-09

## 2024-08-06 RX ADMIN — Medication 1000 MILLIGRAM(S): at 23:04

## 2024-08-06 RX ADMIN — CHLORHEXIDINE GLUCONATE 1 APPLICATION(S): 500 CLOTH TOPICAL at 09:01

## 2024-08-06 RX ADMIN — Medication 400 MILLIGRAM(S): at 18:25

## 2024-08-06 RX ADMIN — Medication 100 MILLIGRAM(S): at 17:18

## 2024-08-06 RX ADMIN — Medication 4 MILLIGRAM(S): at 21:47

## 2024-08-06 RX ADMIN — Medication 500 MILLILITER(S): at 14:00

## 2024-08-06 RX ADMIN — Medication 30 MILLIGRAM(S): at 21:37

## 2024-08-06 RX ADMIN — DEXTROSE MONOHYDRATE, SODIUM CHLORIDE, SODIUM LACTATE, CALCIUM CHLORIDE, MAGNESIUM CHLORIDE 75 MILLILITER(S): 1.5; 538; 448; 18.4; 5.08 SOLUTION INTRAPERITONEAL at 14:26

## 2024-08-06 RX ADMIN — Medication 1000 MILLIGRAM(S): at 18:31

## 2024-08-06 RX ADMIN — Medication 1000 MILLIGRAM(S): at 23:58

## 2024-08-06 RX ADMIN — CARBAMAZEPINE 400 MILLIGRAM(S): 300 CAPSULE, EXTENDED RELEASE ORAL at 21:37

## 2024-08-06 RX ADMIN — LACOSAMIDE 200 MILLIGRAM(S): 50 TABLET, FILM COATED ORAL at 21:47

## 2024-08-06 RX ADMIN — DEXTROSE MONOHYDRATE, SODIUM CHLORIDE, SODIUM LACTATE, CALCIUM CHLORIDE, MAGNESIUM CHLORIDE 125 MILLILITER(S): 1.5; 538; 448; 18.4; 5.08 SOLUTION INTRAPERITONEAL at 17:20

## 2024-08-06 RX ADMIN — Medication 40 MILLIGRAM(S): at 09:01

## 2024-08-06 RX ADMIN — Medication 0.5 MILLIGRAM(S): at 14:45

## 2024-08-06 RX ADMIN — TOPIRAMATE 200 MILLIGRAM(S): 100 TABLET ORAL at 21:37

## 2024-08-06 RX ADMIN — Medication 500 MILLILITER(S): at 18:53

## 2024-08-06 NOTE — PHYSICAL THERAPY INITIAL EVALUATION ADULT - GAIT TRAINING, PT EVAL
Patient will ambulate 150 feet independently with a rolling walker within 1-3 days for safe community ambulation. Patient will ascend/descend 4 stairs independently with +HR and straight cane within 1-3 days for safe household stair negotiation.

## 2024-08-06 NOTE — DISCHARGE NOTE PROVIDER - HOSPITAL COURSE
This patient was admitted to [facility] with a history of severe degenerative joint disease of the left knee djd severe .  Patient underwent Pre-Surgical Testing and was medically cleared to undergo elective procedure. Patient underwent  left total knee replacement THERESE by Dr. Noguera  on 8/6/24  . Procedure was well tolerated.  No operative or francisco-operative complications arose during patient's hospital course.  Patient received antibiotic according to SCIP guidelines for infection prevention.  X was given for DVT prophylaxis, in addition to the use of SCDs.  Anesthesia, Medical Hospitalist, Physical Therapy and Occupational Therapy were consulted. Patient is stable for discharge with a good prognosis.  Appropriate discharge instructions and medications are provided in this document. This patient was admitted to [facility] with a history of severe degenerative joint disease of the left knee djd severe .  Patient underwent Pre-Surgical Testing and was medically cleared to undergo elective procedure. Patient underwent  left total knee replacement THERESE by Dr. Noguera  on 8/6/24  . Procedure was well tolerated.  No operative or francisco-operative complications arose during patient's hospital course.  Patient received antibiotic according to SCIP guidelines for infection prevention.  Lovenox was given for DVT prophylaxis, in addition to the use of SCDs.  Anesthesia, Medical Hospitalist, Physical Therapy and Occupational Therapy were consulted. Patient is stable for discharge with a good prognosis.  Appropriate discharge instructions and medications are provided in this document.

## 2024-08-06 NOTE — DISCHARGE NOTE PROVIDER - NSDCCPTREATMENT_GEN_ALL_CORE_FT
PRINCIPAL PROCEDURE  Procedure: Arthroplasty, knee, total, robot-assisted, using THERESE system  Findings and Treatment: left

## 2024-08-06 NOTE — CONSULT NOTE ADULT - SUBJECTIVE AND OBJECTIVE BOX
HPI: 48F Asthma, HTN, Migraines, Asthma  has been combatting pain in left knee for several years which has progressively worsened.  Patient has tried multiple options for pain relief including OTC medication and as well as PT with minimal relief and has undergone elective replacement of left knee successfully.  Patient is currently resting in bed comfortable with good pain control.     REVIEW OF SYSTEMS:  CONSTITUTIONAL: No fever, weight loss, or fatigue  EYES: No eye pain, visual disturbances, or discharge  ENMT:  No difficulty hearing, tinnitus, vertigo; No sinus or throat pain  NECK: No pain or stiffness  RESPIRATORY: No cough, wheezing, chills or hemoptysis; No shortness of breath  CARDIOVASCULAR: No chest pain, palpitations, dizziness, or leg swelling  GASTROINTESTINAL: No abdominal or epigastric pain. No nausea, vomiting, or hematemesis; No diarrhea or constipation. No melena or hematochezia.  GENITOURINARY: No dysuria, frequency, hematuria, or incontinence  NEUROLOGICAL: No headaches, memory loss, loss of strength, numbness, or tremors  MUSCULOSKELETAL: No muscle or back pain      PAST MEDICAL & SURGICAL HISTORY:  COVID-19 vaccine series completed      History of COVID-19      History of sleep disorder      Epilepsy      Hypertension      Asthma, well controlled      Migraines      Menorrhagia      Primary osteoarthritis of left knee      Elevated liver function tests      Mild expressive language delay      History of endometrial hyperplasia      Morbid obesity with BMI of 40.0-44.9, adult      History of strabismus surgery      H/O dilation and curettage          SOCIAL HISTORY:  Tobacco; EtOH; Illicit Drugs    Allergies    Keppra (Swelling)    Intolerances    moderna covid vaccine  dizziness (Vomiting; Nausea)  Paxlovid (Other)  lactose (Diarrhea)      MEDICATIONS  (STANDING):  acetaminophen     Tablet .. 1000 milliGRAM(s) Oral every 8 hours  acetaminophen   IVPB .. 1000 milliGRAM(s) IV Intermittent once  carBAMazepine ER Tablet 400 milliGRAM(s) Oral two times a day  lacosamide 200 milliGRAM(s) Oral two times a day  lactated ringers. 1000 milliLiter(s) (75 mL/Hr) IV Continuous <Continuous>  nortriptyline 10 milliGRAM(s) Oral at bedtime  topiramate 200 milliGRAM(s) Oral two times a day    MEDICATIONS  (PRN):  albuterol    90 MICROgram(s) HFA Inhaler 2 Puff(s) Inhalation every 6 hours PRN for shortness of breath and/or wheezing  HYDROmorphone  Injectable 0.2 milliGRAM(s) IV Push every 10 minutes PRN Moderate Pain (4 - 6)  HYDROmorphone  Injectable 0.5 milliGRAM(s) IV Push every 10 minutes PRN Severe Pain (7 - 10)  ondansetron Injectable 4 milliGRAM(s) IV Push once PRN Nausea and/or Vomiting      FAMILY HISTORY:  Family history of heart attack (Mother)    Family history of stroke (Grandparent)        Vital Signs Last 24 Hrs  T(C): 36.4 (06 Aug 2024 13:20), Max: 36.7 (06 Aug 2024 08:49)  T(F): 97.5 (06 Aug 2024 13:20), Max: 98.1 (06 Aug 2024 08:49)  HR: 59 (06 Aug 2024 14:45) (56 - 74)  BP: 116/74 (06 Aug 2024 14:45) (102/51 - 141/75)  BP(mean): 85 (06 Aug 2024 14:45) (74 - 92)  RR: 12 (06 Aug 2024 14:45) (11 - 21)  SpO2: 100% (06 Aug 2024 14:45) (100% - 100%)    Parameters below as of 06 Aug 2024 13:20  Patient On (Oxygen Delivery Method): nasal cannula  O2 Flow (L/min): 3      PHYSICAL EXAM:    GENERAL: NAD, well-developed  HEAD:  Atraumatic, Normocephalic  EYES: EOMI, PERRLA, conjunctiva and sclera clear  ENMT: No tonsillar erythema, exudates, or enlargement; Moist mucous membranes, Good dentition, No lesions  NECK: Supple, No JVD, Normal thyroid  NERVOUS SYSTEM:  Alert & Oriented X3, Good concentration;  CHEST/LUNG: Clear to auscultation bilaterally; No rales, rhonchi, wheezing, or rubs  HEART: Regular rate and rhythm; No murmurs, rubs, or gallops  ABDOMEN: Soft, Nontender, Nondistended; Bowel sounds present  EXTREMITIES:  2+ Peripheral Pulses, No clubbing, cyanosis, or edema      LABS:              CAPILLARY BLOOD GLUCOSE          RADIOLOGY & ADDITIONAL STUDIES:    EKG:   Personally Reviewed:  [ ] YES     Imaging:   Personally Reviewed:  [ ] YES     Consultant(s) Notes Reviewed:      Care Discussed with Consultants/Other Providers:

## 2024-08-06 NOTE — DISCHARGE NOTE PROVIDER - NSDCCPCAREPLAN_GEN_ALL_CORE_FT
PRINCIPAL DISCHARGE DIAGNOSIS  Diagnosis: Left knee DJD  Assessment and Plan of Treatment: Physical Therapy/Occupational Therapy for: ambulation, transfers, stairs, ADL's (activities of daily living), range of motion exercises, and isometrics  -Activity  • Weight Bearing as tolerated with rolling walker.  • Take short, frequent walks increasing the distance that you walk each day as tolerated.  • Change your position every hour to decrease pain and stiffness.  • Continue the exercises taught to you by your physical therapist.  • No driving until cleared by the doctor.  • No tub baths, hot tubs, or swimming pools until instructed by your doctor.  • Do not squat down on the floor.  • Do not kneel or twist your knee.  • Range of Motion Goals: Flexion= 120 degrees, Extension = 0 degrees  Keep incision clean and dry. May shower 5 days after surgery if no drainage from incision.  Suture/Prineo removal 2 weeks after surgery at Surgeon's office.

## 2024-08-06 NOTE — PATIENT PROFILE ADULT - FALL HARM RISK - RISK INTERVENTIONS

## 2024-08-06 NOTE — DISCHARGE NOTE PROVIDER - CARE PROVIDER_API CALL
Darrick Noguera  Orthopaedic Surgery  [address], [address]  [place] [address]  Phone: [phone]  Fax: [phone]  Follow Up Time: 2 weeks

## 2024-08-06 NOTE — PHYSICAL THERAPY INITIAL EVALUATION ADULT - TRANSFER TRAINING, PT EVAL
Patient will perform transfers independently with a rolling walker within 1-3 days to promote safety and reduce risk of falls.

## 2024-08-06 NOTE — PHYSICAL THERAPY INITIAL EVALUATION ADULT - ADDITIONAL COMMENTS
Pt lives with stepmother in an apartment, there are 4 stairs +HR to enter and no stairs to negotiate within. Pt has a tub shower. PTA pt was independent with ADLs and ambulation. Pt owns a rollator.

## 2024-08-06 NOTE — DISCHARGE NOTE PROVIDER - NSDCMRMEDTOKEN_GEN_ALL_CORE_FT
Albuterol (Eqv-Proventil HFA) 90 mcg/inh inhalation aerosol: 2 puff(s) inhaled once a day as needed for  shortness of breath and/or wheezing  amLODIPine 5 mg oral tablet: 1 tab(s) orally once a day  nortriptyline 10 mg oral capsule: 3 cap(s) orally once a day (at bedtime)  Tegretol  mg oral tablet, extended release: 1 tab(s) orally 2 times a day  Topamax 200 mg oral tablet: 1 tab(s) orally 2 times a day  Vimpat 200 mg oral tablet: 1 tab(s) orally 2 times a day  Vitamin C 1000 mg oral tablet: 1 tab(s) orally once a day   acetaminophen 500 mg oral tablet: 2 tab(s) orally every 8 hours Take for 3 weeks for pain mgmt  Albuterol (Eqv-Proventil HFA) 90 mcg/inh inhalation aerosol: 2 puff(s) inhaled once a day as needed for  shortness of breath and/or wheezing  amLODIPine 5 mg oral tablet: 1 tab(s) orally once a day  cefadroxil 500 mg oral capsule: 1 cap(s) orally every 12 hours Take 500mg 2x a day for one week for infection prevention  celecoxib 200 mg oral capsule: 1 cap(s) orally every 12 hours Take 2 hours after aspirin for 28 days  HYDROmorphone 2 mg oral tablet: 1 tab(s) orally every 3 hours As needed Moderate Pain (4 - 6)  nortriptyline 10 mg oral capsule: 3 cap(s) orally once a day (at bedtime)  omeprazole 20 mg oral delayed release capsule: 1 cap(s) orally once a day  senna leaf extract oral tablet: 2 tab(s) orally once a day (at bedtime)  Tegretol  mg oral tablet, extended release: 1 tab(s) orally 2 times a day  Topamax 200 mg oral tablet: 1 tab(s) orally 2 times a day  Vimpat 200 mg oral tablet: 1 tab(s) orally 2 times a day

## 2024-08-06 NOTE — PHYSICAL THERAPY INITIAL EVALUATION ADULT - BED MOBILITY TRAINING, PT EVAL
Patient will perform bed mobility independently within 1-3 days to promote independence with functional mobility.

## 2024-08-06 NOTE — PHYSICAL THERAPY INITIAL EVALUATION ADULT - LEVEL OF INDEPENDENCE: SIT/STAND, REHAB EVAL
due to left knee pain, pt deferring transfers and requesting to return to supine position/unable to perform

## 2024-08-07 LAB
ALBUMIN SERPL ELPH-MCNC: 2.7 G/DL — LOW (ref 3.3–5)
ALP SERPL-CCNC: 84 U/L — SIGNIFICANT CHANGE UP (ref 30–120)
ALT FLD-CCNC: 10 U/L — SIGNIFICANT CHANGE UP (ref 10–60)
ANION GAP SERPL CALC-SCNC: 10 MMOL/L — SIGNIFICANT CHANGE UP (ref 5–17)
AST SERPL-CCNC: 19 U/L — SIGNIFICANT CHANGE UP (ref 10–40)
BILIRUB DIRECT SERPL-MCNC: 0 MG/DL — SIGNIFICANT CHANGE UP (ref 0–0.3)
BILIRUB INDIRECT FLD-MCNC: 0.2 MG/DL — SIGNIFICANT CHANGE UP (ref 0.2–1)
BILIRUB SERPL-MCNC: 0.2 MG/DL — SIGNIFICANT CHANGE UP (ref 0.2–1.2)
BUN SERPL-MCNC: 13 MG/DL — SIGNIFICANT CHANGE UP (ref 7–23)
CALCIUM SERPL-MCNC: 8.2 MG/DL — LOW (ref 8.4–10.5)
CHLORIDE SERPL-SCNC: 105 MMOL/L — SIGNIFICANT CHANGE UP (ref 96–108)
CO2 SERPL-SCNC: 21 MMOL/L — LOW (ref 22–31)
CREAT SERPL-MCNC: 0.85 MG/DL — SIGNIFICANT CHANGE UP (ref 0.5–1.3)
EGFR: 86 ML/MIN/1.73M2 — SIGNIFICANT CHANGE UP
GLUCOSE SERPL-MCNC: 112 MG/DL — HIGH (ref 70–99)
HCT VFR BLD CALC: 30.2 % — LOW (ref 34.5–45)
HGB BLD-MCNC: 9.5 G/DL — LOW (ref 11.5–15.5)
MCHC RBC-ENTMCNC: 28.9 PG — SIGNIFICANT CHANGE UP (ref 27–34)
MCHC RBC-ENTMCNC: 31.5 GM/DL — LOW (ref 32–36)
MCV RBC AUTO: 91.8 FL — SIGNIFICANT CHANGE UP (ref 80–100)
NRBC # BLD: 0 /100 WBCS — SIGNIFICANT CHANGE UP (ref 0–0)
PLATELET # BLD AUTO: 236 K/UL — SIGNIFICANT CHANGE UP (ref 150–400)
POTASSIUM SERPL-MCNC: 3.4 MMOL/L — LOW (ref 3.5–5.3)
POTASSIUM SERPL-SCNC: 3.4 MMOL/L — LOW (ref 3.5–5.3)
PROT SERPL-MCNC: 6.4 G/DL — SIGNIFICANT CHANGE UP (ref 6–8.3)
RBC # BLD: 3.29 M/UL — LOW (ref 3.8–5.2)
RBC # FLD: 13.2 % — SIGNIFICANT CHANGE UP (ref 10.3–14.5)
SODIUM SERPL-SCNC: 136 MMOL/L — SIGNIFICANT CHANGE UP (ref 135–145)
WBC # BLD: 10.46 K/UL — SIGNIFICANT CHANGE UP (ref 3.8–10.5)
WBC # FLD AUTO: 10.46 K/UL — SIGNIFICANT CHANGE UP (ref 3.8–10.5)

## 2024-08-07 PROCEDURE — 99232 SBSQ HOSP IP/OBS MODERATE 35: CPT

## 2024-08-07 RX ORDER — ENOXAPARIN SODIUM 120 MG/.8ML
40 INJECTION SUBCUTANEOUS EVERY 12 HOURS
Refills: 0 | Status: DISCONTINUED | OUTPATIENT
Start: 2024-08-07 | End: 2024-08-09

## 2024-08-07 RX ORDER — HYDROMORPHONE HCL IN 0.9% NACL 0.2 MG/ML
2 PLASTIC BAG, INJECTION (ML) INTRAVENOUS
Refills: 0 | Status: DISCONTINUED | OUTPATIENT
Start: 2024-08-07 | End: 2024-08-09

## 2024-08-07 RX ORDER — HYDROMORPHONE HCL IN 0.9% NACL 0.2 MG/ML
4 PLASTIC BAG, INJECTION (ML) INTRAVENOUS EVERY 4 HOURS
Refills: 0 | Status: DISCONTINUED | OUTPATIENT
Start: 2024-08-07 | End: 2024-08-09

## 2024-08-07 RX ORDER — CEFADROXIL 500 MG/1
500 CAPSULE ORAL EVERY 12 HOURS
Refills: 0 | Status: DISCONTINUED | OUTPATIENT
Start: 2024-08-07 | End: 2024-08-09

## 2024-08-07 RX ADMIN — CEFADROXIL 500 MILLIGRAM(S): 500 CAPSULE ORAL at 21:30

## 2024-08-07 RX ADMIN — Medication 1000 MILLIGRAM(S): at 23:34

## 2024-08-07 RX ADMIN — Medication 2 MILLIGRAM(S): at 19:00

## 2024-08-07 RX ADMIN — OXYCODONE HYDROCHLORIDE 5 MILLIGRAM(S): 30 TABLET ORAL at 04:42

## 2024-08-07 RX ADMIN — OXYCODONE HYDROCHLORIDE 10 MILLIGRAM(S): 30 TABLET ORAL at 08:55

## 2024-08-07 RX ADMIN — PANTOPRAZOLE SODIUM 40 MILLIGRAM(S): 20 TABLET, DELAYED RELEASE ORAL at 06:03

## 2024-08-07 RX ADMIN — CELECOXIB 200 MILLIGRAM(S): 200 CAPSULE ORAL at 09:03

## 2024-08-07 RX ADMIN — ENOXAPARIN SODIUM 40 MILLIGRAM(S): 120 INJECTION SUBCUTANEOUS at 19:01

## 2024-08-07 RX ADMIN — CARBAMAZEPINE 400 MILLIGRAM(S): 300 CAPSULE, EXTENDED RELEASE ORAL at 09:26

## 2024-08-07 RX ADMIN — Medication 1000 MILLIGRAM(S): at 06:03

## 2024-08-07 RX ADMIN — CELECOXIB 200 MILLIGRAM(S): 200 CAPSULE ORAL at 08:53

## 2024-08-07 RX ADMIN — DEXTROSE MONOHYDRATE, SODIUM CHLORIDE, SODIUM LACTATE, CALCIUM CHLORIDE, MAGNESIUM CHLORIDE 125 MILLILITER(S): 1.5; 538; 448; 18.4; 5.08 SOLUTION INTRAPERITONEAL at 00:46

## 2024-08-07 RX ADMIN — Medication 1000 MILLIGRAM(S): at 15:08

## 2024-08-07 RX ADMIN — CELECOXIB 200 MILLIGRAM(S): 200 CAPSULE ORAL at 21:29

## 2024-08-07 RX ADMIN — TOPIRAMATE 200 MILLIGRAM(S): 100 TABLET ORAL at 09:26

## 2024-08-07 RX ADMIN — Medication 2 MILLIGRAM(S): at 14:55

## 2024-08-07 RX ADMIN — Medication 2 MILLIGRAM(S): at 13:55

## 2024-08-07 RX ADMIN — Medication 1000 MILLIGRAM(S): at 14:45

## 2024-08-07 RX ADMIN — Medication 100 MILLIGRAM(S): at 00:45

## 2024-08-07 RX ADMIN — OXYCODONE HYDROCHLORIDE 10 MILLIGRAM(S): 30 TABLET ORAL at 09:55

## 2024-08-07 RX ADMIN — CARBAMAZEPINE 400 MILLIGRAM(S): 300 CAPSULE, EXTENDED RELEASE ORAL at 19:00

## 2024-08-07 RX ADMIN — DEXAMETHASONE 101.6 MILLIGRAM(S): 1.5 TABLET ORAL at 06:01

## 2024-08-07 RX ADMIN — Medication 2 MILLIGRAM(S): at 20:00

## 2024-08-07 RX ADMIN — SENNOSIDES 2 TABLET(S): 8.6 TABLET ORAL at 21:30

## 2024-08-07 RX ADMIN — LACOSAMIDE 200 MILLIGRAM(S): 50 TABLET, FILM COATED ORAL at 09:26

## 2024-08-07 RX ADMIN — Medication 17 GRAM(S): at 21:30

## 2024-08-07 RX ADMIN — LACOSAMIDE 200 MILLIGRAM(S): 50 TABLET, FILM COATED ORAL at 19:00

## 2024-08-07 RX ADMIN — TOPIRAMATE 200 MILLIGRAM(S): 100 TABLET ORAL at 19:00

## 2024-08-07 RX ADMIN — OXYCODONE HYDROCHLORIDE 5 MILLIGRAM(S): 30 TABLET ORAL at 05:35

## 2024-08-07 RX ADMIN — CEFADROXIL 500 MILLIGRAM(S): 500 CAPSULE ORAL at 11:18

## 2024-08-07 RX ADMIN — Medication 81 MILLIGRAM(S): at 06:02

## 2024-08-07 RX ADMIN — CELECOXIB 200 MILLIGRAM(S): 200 CAPSULE ORAL at 22:00

## 2024-08-07 RX ADMIN — Medication 30 MILLIGRAM(S): at 21:29

## 2024-08-07 RX ADMIN — Medication 1000 MILLIGRAM(S): at 06:00

## 2024-08-07 NOTE — CARE COORDINATION ASSESSMENT. - NSPASTMEDSURGHISTORY_GEN_ALL_CORE_FT
PAST MEDICAL & SURGICAL HISTORY:  Morbid obesity with BMI of 40.0-44.9, adult      History of endometrial hyperplasia      Mild expressive language delay      Elevated liver function tests      Primary osteoarthritis of left knee      Menorrhagia      Migraines      Asthma, well controlled      Hypertension      Epilepsy      History of sleep disorder      History of COVID-19      COVID-19 vaccine series completed      H/O dilation and curettage      History of strabismus surgery

## 2024-08-07 NOTE — CARE COORDINATION ASSESSMENT. - EMPLOYMENT STATUS OF PATIENT
[Provider aware of all medications taken (including OTC)] : Patient stated provider is aware of all medications ~he/she~ is taking including OTC  epilepsy/disabled

## 2024-08-07 NOTE — SOCIAL WORK PROGRESS NOTE - NSSWPROGRESSNOTE_GEN_ALL_CORE
SW spoke with patient's mother Colette wyatt [phone] and confirmed her contact information. SW explained name role availability. She wanted referral made to sub acute rehab closer to home and provided [facility] as well as [facility]aret [facility]. She was also receptive to SW referring patient to rehab in [facility]. FOX spoke with PT patient having pain and reluctant to bear weight. SW will follow for rehab and I called ortho PA to advise

## 2024-08-07 NOTE — OCCUPATIONAL THERAPY INITIAL EVALUATION ADULT - ADDITIONAL COMMENTS
as per pt she was a household ambulator   Pt lives with step mother in an apartment [address]   +tub with shower chair

## 2024-08-07 NOTE — CARE COORDINATION ASSESSMENT. - NSCAREPROVIDERS_GEN_ALL_CORE_FT
CARE PROVIDERS:  Administration: Mio Jasso  Admitting: Darrick Noguera  Attending: Darrick Noguera  Case Management: Santaromana, Anna  Consultant: Vaughn Coffman  Covering Team: Mark Lubin  Infection Control: Juany Connors  Nurse: Adam, Merline  Nurse: Ellie Miranda  Occupational Therapy: Carmela Segal  Override: Dipti Renee  Override: Adam, Merline  PCA/Nursing Assistant: Peggy Palmer  PCA/Nursing Assistant: Delores Lea  Physical Therapy: Sirena Alicea  Primary Team: Dayanara Dick  Primary Team: Neel Pollard  Primary Team: Pavan Mcclendon  Primary Team: Monica Frost  Primary Team: Genaro Knapp  Primary Team: Tania Gore  Primary Team: Mojgan Costello  Primary Team: Hair Wilkerson  Registered Dietitian: Norma Leblanc  Respiratory Therapy: Sarika Henderson  : Marilee Albarran  Team: ABDOUL  Hospitalists, Team  UR// Supp. Assoc.: Mamta Jacinto

## 2024-08-07 NOTE — OCCUPATIONAL THERAPY INITIAL EVALUATION ADULT - TRANSFER TRAINING, PT EVAL
Patient will transfer to toilet with DME as needed with minimal assistance x 2  within 2-3 sessions.

## 2024-08-07 NOTE — OCCUPATIONAL THERAPY INITIAL EVALUATION ADULT - ORIENTATION, REHAB EVAL
pt appears to require increase time to progress new information and to answer questions/oriented to person, place, time and situation

## 2024-08-07 NOTE — CARE COORDINATION ASSESSMENT. - FACILITY OF RESIDENCE YN
Currently stable. Continue to monitor patient. C/w pain medication as needed. Follow up urology recommendations.
No

## 2024-08-07 NOTE — CARE COORDINATION ASSESSMENT. - OTHER PERTINENT DISCHARGE PLANNING INFORMATION:
SW met with this 48 year old female admitted from home now sp left TKR by Dr. Noguera and is recommended for rehab. I met with patient who presents as A&O x 4. She lives with her mother who is retired, Colette [phone] and her father Manjinder eYe whom she said I can call as well [phone]. Patient in agreement with recommendations for rehab and asked me to call her parents to discuss choices. Message left for both parents.

## 2024-08-08 LAB
ANION GAP SERPL CALC-SCNC: 9 MMOL/L — SIGNIFICANT CHANGE UP (ref 5–17)
BUN SERPL-MCNC: 13 MG/DL — SIGNIFICANT CHANGE UP (ref 7–23)
CALCIUM SERPL-MCNC: 8.5 MG/DL — SIGNIFICANT CHANGE UP (ref 8.4–10.5)
CHLORIDE SERPL-SCNC: 108 MMOL/L — SIGNIFICANT CHANGE UP (ref 96–108)
CO2 SERPL-SCNC: 24 MMOL/L — SIGNIFICANT CHANGE UP (ref 22–31)
CREAT SERPL-MCNC: 0.81 MG/DL — SIGNIFICANT CHANGE UP (ref 0.5–1.3)
EGFR: 91 ML/MIN/1.73M2 — SIGNIFICANT CHANGE UP
GLUCOSE SERPL-MCNC: 108 MG/DL — HIGH (ref 70–99)
HCT VFR BLD CALC: 28.7 % — LOW (ref 34.5–45)
HGB BLD-MCNC: 9.3 G/DL — LOW (ref 11.5–15.5)
MCHC RBC-ENTMCNC: 29.2 PG — SIGNIFICANT CHANGE UP (ref 27–34)
MCHC RBC-ENTMCNC: 32.4 GM/DL — SIGNIFICANT CHANGE UP (ref 32–36)
MCV RBC AUTO: 90.3 FL — SIGNIFICANT CHANGE UP (ref 80–100)
NRBC # BLD: 0 /100 WBCS — SIGNIFICANT CHANGE UP (ref 0–0)
PLATELET # BLD AUTO: 247 K/UL — SIGNIFICANT CHANGE UP (ref 150–400)
POTASSIUM SERPL-MCNC: 3 MMOL/L — LOW (ref 3.5–5.3)
POTASSIUM SERPL-SCNC: 3 MMOL/L — LOW (ref 3.5–5.3)
RBC # BLD: 3.18 M/UL — LOW (ref 3.8–5.2)
RBC # FLD: 13.2 % — SIGNIFICANT CHANGE UP (ref 10.3–14.5)
SODIUM SERPL-SCNC: 141 MMOL/L — SIGNIFICANT CHANGE UP (ref 135–145)
WBC # BLD: 9.96 K/UL — SIGNIFICANT CHANGE UP (ref 3.8–10.5)
WBC # FLD AUTO: 9.96 K/UL — SIGNIFICANT CHANGE UP (ref 3.8–10.5)

## 2024-08-08 PROCEDURE — 99233 SBSQ HOSP IP/OBS HIGH 50: CPT

## 2024-08-08 RX ORDER — POTASSIUM CHLORIDE 1500 MG/1
40 TABLET, EXTENDED RELEASE ORAL ONCE
Refills: 0 | Status: COMPLETED | OUTPATIENT
Start: 2024-08-08 | End: 2024-08-08

## 2024-08-08 RX ORDER — OMEPRAZOLE 100 %
1 POWDER (GRAM) MISCELLANEOUS
Qty: 0 | Refills: 0 | DISCHARGE

## 2024-08-08 RX ORDER — SENNOSIDES 8.6 MG/1
2 TABLET ORAL
Qty: 0 | Refills: 0 | DISCHARGE
Start: 2024-08-08

## 2024-08-08 RX ORDER — CELECOXIB 200 MG/1
1 CAPSULE ORAL
Qty: 0 | Refills: 0 | DISCHARGE
Start: 2024-08-08

## 2024-08-08 RX ORDER — CEFADROXIL 500 MG/1
1 CAPSULE ORAL
Qty: 0 | Refills: 0 | DISCHARGE
Start: 2024-08-08

## 2024-08-08 RX ORDER — ACETAMINOPHEN 500 MG
2 TABLET ORAL
Qty: 0 | Refills: 0 | DISCHARGE
Start: 2024-08-08

## 2024-08-08 RX ORDER — HYDROMORPHONE HCL IN 0.9% NACL 0.2 MG/ML
1 PLASTIC BAG, INJECTION (ML) INTRAVENOUS
Qty: 0 | Refills: 0 | DISCHARGE
Start: 2024-08-08

## 2024-08-08 RX ADMIN — TOPIRAMATE 200 MILLIGRAM(S): 100 TABLET ORAL at 06:22

## 2024-08-08 RX ADMIN — POTASSIUM CHLORIDE 40 MILLIEQUIVALENT(S): 1500 TABLET, EXTENDED RELEASE ORAL at 17:57

## 2024-08-08 RX ADMIN — TOPIRAMATE 200 MILLIGRAM(S): 100 TABLET ORAL at 17:56

## 2024-08-08 RX ADMIN — CELECOXIB 200 MILLIGRAM(S): 200 CAPSULE ORAL at 08:45

## 2024-08-08 RX ADMIN — LACOSAMIDE 200 MILLIGRAM(S): 50 TABLET, FILM COATED ORAL at 06:33

## 2024-08-08 RX ADMIN — ENOXAPARIN SODIUM 40 MILLIGRAM(S): 120 INJECTION SUBCUTANEOUS at 17:57

## 2024-08-08 RX ADMIN — CELECOXIB 200 MILLIGRAM(S): 200 CAPSULE ORAL at 21:45

## 2024-08-08 RX ADMIN — LACOSAMIDE 200 MILLIGRAM(S): 50 TABLET, FILM COATED ORAL at 18:05

## 2024-08-08 RX ADMIN — CEFADROXIL 500 MILLIGRAM(S): 500 CAPSULE ORAL at 08:45

## 2024-08-08 RX ADMIN — Medication 2 MILLIGRAM(S): at 09:30

## 2024-08-08 RX ADMIN — Medication 30 MILLIGRAM(S): at 21:15

## 2024-08-08 RX ADMIN — Medication 2 MILLIGRAM(S): at 08:47

## 2024-08-08 RX ADMIN — Medication 1000 MILLIGRAM(S): at 14:58

## 2024-08-08 RX ADMIN — Medication 2 MILLIGRAM(S): at 14:57

## 2024-08-08 RX ADMIN — CARBAMAZEPINE 400 MILLIGRAM(S): 300 CAPSULE, EXTENDED RELEASE ORAL at 05:40

## 2024-08-08 RX ADMIN — SENNOSIDES 2 TABLET(S): 8.6 TABLET ORAL at 21:16

## 2024-08-08 RX ADMIN — CEFADROXIL 500 MILLIGRAM(S): 500 CAPSULE ORAL at 21:15

## 2024-08-08 RX ADMIN — CELECOXIB 200 MILLIGRAM(S): 200 CAPSULE ORAL at 09:30

## 2024-08-08 RX ADMIN — CARBAMAZEPINE 400 MILLIGRAM(S): 300 CAPSULE, EXTENDED RELEASE ORAL at 17:57

## 2024-08-08 RX ADMIN — PANTOPRAZOLE SODIUM 40 MILLIGRAM(S): 20 TABLET, DELAYED RELEASE ORAL at 05:39

## 2024-08-08 RX ADMIN — Medication 2 MILLIGRAM(S): at 15:35

## 2024-08-08 RX ADMIN — AMLODIPINE BESYLATE 5 MILLIGRAM(S): 2.5 TABLET ORAL at 05:39

## 2024-08-08 RX ADMIN — CELECOXIB 200 MILLIGRAM(S): 200 CAPSULE ORAL at 21:15

## 2024-08-08 RX ADMIN — ENOXAPARIN SODIUM 40 MILLIGRAM(S): 120 INJECTION SUBCUTANEOUS at 05:42

## 2024-08-08 RX ADMIN — Medication 17 GRAM(S): at 21:15

## 2024-08-08 RX ADMIN — Medication 1000 MILLIGRAM(S): at 06:52

## 2024-08-08 RX ADMIN — Medication 1000 MILLIGRAM(S): at 23:00

## 2024-08-08 NOTE — SOCIAL WORK PROGRESS NOTE - NSSWPROGRESSNOTE_GEN_ALL_CORE
spoke with Zeinab at [facility] [phone], who can offer bed. She provided npi for facility and MD. FOX sent POA to fax [phone] requesting approval. FOX called patient's mother Colette [phone] to advise that we are working on approval. patient has medicaid as well [id number]. Patient has been having pain. FOX to confirm what mode of transport once we obtain insurance approval for sub acute rehab

## 2024-08-08 NOTE — PHARMACOTHERAPY INTERVENTION NOTE - COMMENTS
Admission medication reconciliation POD1 
Transition of Care video discharge education - medication calendar given to patient

## 2024-08-09 ENCOUNTER — TRANSCRIPTION ENCOUNTER (OUTPATIENT)
Age: 49
End: 2024-08-09

## 2024-08-09 VITALS
RESPIRATION RATE: 18 BRPM | SYSTOLIC BLOOD PRESSURE: 121 MMHG | TEMPERATURE: 98 F | HEART RATE: 97 BPM | DIASTOLIC BLOOD PRESSURE: 87 MMHG | OXYGEN SATURATION: 98 %

## 2024-08-09 LAB
ANION GAP SERPL CALC-SCNC: 8 MMOL/L — SIGNIFICANT CHANGE UP (ref 5–17)
BUN SERPL-MCNC: 11 MG/DL — SIGNIFICANT CHANGE UP (ref 7–23)
CALCIUM SERPL-MCNC: 8.6 MG/DL — SIGNIFICANT CHANGE UP (ref 8.4–10.5)
CHLORIDE SERPL-SCNC: 107 MMOL/L — SIGNIFICANT CHANGE UP (ref 96–108)
CO2 SERPL-SCNC: 26 MMOL/L — SIGNIFICANT CHANGE UP (ref 22–31)
CREAT SERPL-MCNC: 0.77 MG/DL — SIGNIFICANT CHANGE UP (ref 0.5–1.3)
EGFR: 96 ML/MIN/1.73M2 — SIGNIFICANT CHANGE UP
GLUCOSE SERPL-MCNC: 98 MG/DL — SIGNIFICANT CHANGE UP (ref 70–99)
HCT VFR BLD CALC: 27.3 % — LOW (ref 34.5–45)
HGB BLD-MCNC: 8.7 G/DL — LOW (ref 11.5–15.5)
MCHC RBC-ENTMCNC: 28.6 PG — SIGNIFICANT CHANGE UP (ref 27–34)
MCHC RBC-ENTMCNC: 31.9 GM/DL — LOW (ref 32–36)
MCV RBC AUTO: 89.8 FL — SIGNIFICANT CHANGE UP (ref 80–100)
NRBC # BLD: 0 /100 WBCS — SIGNIFICANT CHANGE UP (ref 0–0)
PLATELET # BLD AUTO: 241 K/UL — SIGNIFICANT CHANGE UP (ref 150–400)
POTASSIUM SERPL-MCNC: 3.6 MMOL/L — SIGNIFICANT CHANGE UP (ref 3.5–5.3)
POTASSIUM SERPL-SCNC: 3.6 MMOL/L — SIGNIFICANT CHANGE UP (ref 3.5–5.3)
RBC # BLD: 3.04 M/UL — LOW (ref 3.8–5.2)
RBC # FLD: 13.3 % — SIGNIFICANT CHANGE UP (ref 10.3–14.5)
SARS-COV-2 RNA SPEC QL NAA+PROBE: SIGNIFICANT CHANGE UP
SODIUM SERPL-SCNC: 141 MMOL/L — SIGNIFICANT CHANGE UP (ref 135–145)
WBC # BLD: 9.07 K/UL — SIGNIFICANT CHANGE UP (ref 3.8–10.5)
WBC # FLD AUTO: 9.07 K/UL — SIGNIFICANT CHANGE UP (ref 3.8–10.5)

## 2024-08-09 PROCEDURE — 99233 SBSQ HOSP IP/OBS HIGH 50: CPT

## 2024-08-09 PROCEDURE — 80076 HEPATIC FUNCTION PANEL: CPT

## 2024-08-09 PROCEDURE — 97116 GAIT TRAINING THERAPY: CPT

## 2024-08-09 PROCEDURE — 97110 THERAPEUTIC EXERCISES: CPT

## 2024-08-09 PROCEDURE — S2900: CPT

## 2024-08-09 PROCEDURE — 81025 URINE PREGNANCY TEST: CPT

## 2024-08-09 PROCEDURE — 97530 THERAPEUTIC ACTIVITIES: CPT

## 2024-08-09 PROCEDURE — 36415 COLL VENOUS BLD VENIPUNCTURE: CPT

## 2024-08-09 PROCEDURE — 94664 DEMO&/EVAL PT USE INHALER: CPT

## 2024-08-09 PROCEDURE — C1776: CPT

## 2024-08-09 PROCEDURE — 80048 BASIC METABOLIC PNL TOTAL CA: CPT

## 2024-08-09 PROCEDURE — 87635 SARS-COV-2 COVID-19 AMP PRB: CPT

## 2024-08-09 PROCEDURE — C1713: CPT

## 2024-08-09 PROCEDURE — 85027 COMPLETE CBC AUTOMATED: CPT

## 2024-08-09 PROCEDURE — 97161 PT EVAL LOW COMPLEX 20 MIN: CPT

## 2024-08-09 PROCEDURE — 73560 X-RAY EXAM OF KNEE 1 OR 2: CPT

## 2024-08-09 PROCEDURE — 97165 OT EVAL LOW COMPLEX 30 MIN: CPT

## 2024-08-09 RX ADMIN — Medication 1000 MILLIGRAM(S): at 06:12

## 2024-08-09 RX ADMIN — CARBAMAZEPINE 400 MILLIGRAM(S): 300 CAPSULE, EXTENDED RELEASE ORAL at 10:29

## 2024-08-09 RX ADMIN — PANTOPRAZOLE SODIUM 40 MILLIGRAM(S): 20 TABLET, DELAYED RELEASE ORAL at 06:11

## 2024-08-09 RX ADMIN — TOPIRAMATE 200 MILLIGRAM(S): 100 TABLET ORAL at 06:11

## 2024-08-09 RX ADMIN — Medication 1000 MILLIGRAM(S): at 14:07

## 2024-08-09 RX ADMIN — CEFADROXIL 500 MILLIGRAM(S): 500 CAPSULE ORAL at 14:07

## 2024-08-09 RX ADMIN — AMLODIPINE BESYLATE 5 MILLIGRAM(S): 2.5 TABLET ORAL at 06:12

## 2024-08-09 RX ADMIN — Medication 2 MILLIGRAM(S): at 09:29

## 2024-08-09 RX ADMIN — CELECOXIB 200 MILLIGRAM(S): 200 CAPSULE ORAL at 09:41

## 2024-08-09 RX ADMIN — ENOXAPARIN SODIUM 40 MILLIGRAM(S): 120 INJECTION SUBCUTANEOUS at 06:12

## 2024-08-09 RX ADMIN — LACOSAMIDE 200 MILLIGRAM(S): 50 TABLET, FILM COATED ORAL at 10:29

## 2024-08-09 RX ADMIN — Medication 1000 MILLIGRAM(S): at 00:51

## 2024-08-09 RX ADMIN — Medication 1000 MILLIGRAM(S): at 06:42

## 2024-08-09 RX ADMIN — CELECOXIB 200 MILLIGRAM(S): 200 CAPSULE ORAL at 10:46

## 2024-08-09 RX ADMIN — Medication 2 MILLIGRAM(S): at 09:59

## 2024-08-09 NOTE — DIETITIAN INITIAL EVALUATION ADULT - PERTINENT LABORATORY DATA
08-09    141  |  107  |  11  ----------------------------<  98  3.6   |  26  |  0.77    Ca    8.6      09 Aug 2024 06:00    A1C with Estimated Average Glucose Result: 5.3 % (06-20-24 @ 12:40)

## 2024-08-09 NOTE — DIETITIAN INITIAL EVALUATION ADULT - OTHER CALCULATIONS
adjusted body weight used to assess calorie and protein needs due to BMI >40  dosing weight used for fluid needs

## 2024-08-09 NOTE — DIETITIAN INITIAL EVALUATION ADULT - LITERATURE/VIDEOS GIVEN
encourage healthy food choices, incorporating lean proteins, whole grains and fruits and vegetables  resume physical activity as medically feasible

## 2024-08-09 NOTE — SOCIAL WORK PROGRESS NOTE - NSSWPROGRESSNOTE_GEN_ALL_CORE
FOX called [facility] for update on status of authorization for JESUSITA at [facility].  auth # [id number]. from 8/8-8/20.  level 1.  AMARA Linder # .   SW sent message for JESUSITA with auth and to inquire about  time.  Auth is required for ambulance if needed.

## 2024-08-09 NOTE — PROGRESS NOTE ADULT - NUTRITIONAL ASSESSMENT
This patient has been assessed with a concern for Malnutrition and has been determined to have a diagnosis/diagnoses of Morbid obesity (BMI > 40).    This patient is being managed with:   Diet Regular-  Entered: Aug  6 2024  3:48PM  
This patient has been assessed with a concern for Malnutrition and has been determined to have a diagnosis/diagnoses of Morbid obesity (BMI > 40).    This patient is being managed with:   Diet Regular-  Entered: Aug  6 2024  3:48PM

## 2024-08-09 NOTE — DIETITIAN INITIAL EVALUATION ADULT - ETIOLOGY
history of excessive energy intake, exacerbated by decreased physical activity due to limited mobility

## 2024-08-09 NOTE — PROGRESS NOTE ADULT - ASSESSMENT
48F Asthma, HTN, Migraines, Asthma admitted for aftercare following Left TKR    S/P Left TKR  POD 1  Continue Bowel and pain control regimen;    Incentive Spirometer for lung expansion;   Monitor Hgb and follow up electrolytes.      Acute Blood Loss Anemia   Most likely perioperative losses;   Assymptomatic; Goal HgB >7.0    HTN  Controlled  Continue Amlodipine    Asthma  Albuterol PRN    Migraines  Tegretol + Vimpat + Nortriptyline + Topamax    Diet  Regular    DVT Prophylaxis   Aspirin BID    Disposition  Patient medically optimized for discharge if cleared by PT and Ortho. 
48F Asthma, HTN, Migraines, Asthma admitted for aftercare following Left TKR    S/P Left TKR  POD 1  Continue Bowel and pain control regimen;    Incentive Spirometer for lung expansion;   Monitor Hgb and follow up electrolytes.      HTN  Controlled  Continue Amlodipine    Asthma  Albuterol PRN    Migraines  Tegretol + Vimpat + Nortriptyline + Topamax    Diet  Regular    DVT Prophylaxis   Aspirin BID    Disposition  Patient medically optimized for discharge home if cleared by PT and Ortho.
Neurovascular status in tact.  Discharge to rehab facility when criteria met
48F Asthma, HTN, Migraines, Asthma admitted for aftercare following Left TKR    S/P Left TKR  POD 2  Continue Bowel and pain control regimen;    Incentive Spirometer for lung expansion;   Monitor Hgb and follow up electrolytes.      HTN  Controlled  Continue Amlodipine    Asthma  Albuterol PRN    Migraines  Tegretol + Vimpat + Nortriptyline + Topamax    Diet  Regular    DVT Prophylaxis   Aspirin BID    Disposition  Patient medically optimized for discharge if cleared by PT and Ortho.

## 2024-08-09 NOTE — PROGRESS NOTE ADULT - SUBJECTIVE AND OBJECTIVE BOX
SUBJECTIVE: Rounds with Dr Noguera. Patient seen and examined. No nausea/vomiting, nor shortness of breath. Patient with difficulty moving the left leg, instructed patient to fully extend the left knee and flexion. Patient was a bit argumentative of having to place the cold cryocuff on the left knee and placed an ACE wrap to have a barrier between the cryocuff and skin. Patient has participated with physical therapy yesterday and noted their recommendation for [facility] vs home.    OBJECTIVE:     Vital Signs Last 24 Hrs  T(C): 37.2 (07 Aug 2024 03:20), Max: 37.2 (07 Aug 2024 03:20)  T(F): 99 (07 Aug 2024 03:20), Max: 99 (07 Aug 2024 03:20)  HR: 79 (07 Aug 2024 03:20) (56 - 79)  BP: 115/67 (07 Aug 2024 03:20) (102/51 - 141/75)  BP(mean): 83 (07 Aug 2024 03:20) (74 - 92)  RR: 18 (07 Aug 2024 03:20) (11 - 21)  SpO2: 96% (07 Aug 2024 03:20) (96% - 100%)    Parameters below as of 07 Aug 2024 03:20  Patient On (Oxygen Delivery Method): room air        PAIN SCORE:   7 with moving and 1 with no movement      SCALE USED: (1-10 VNRS)        Affected extremity:          Dressing: clean/dry/intact post operative dressing of Mepilex on the surgical wound. Removed the compressive Manuel's dressing and surrounding skin with some mild bruising. Noted post operative normal swelling.           Sensation:  intact to light touch of bilateral feet         Motor exam:          5/ 5 Tibialis Anterior/Gastrocnemius-Soleus , EHL         warm well perfused; capillary refill <3 seconds     LABS: drawn this am      MEDICATIONS:    Anticoagulation:  aspirin enteric coated 81 milliGRAM(s) Oral every 12 hours      Antibiotics:       Pain medications:   acetaminophen     Tablet .. 1000 milliGRAM(s) Oral every 8 hours  carBAMazepine ER Tablet 400 milliGRAM(s) Oral two times a day  celecoxib 200 milliGRAM(s) Oral every 12 hours  HYDROmorphone  Injectable 0.5 milliGRAM(s) IV Push every 3 hours PRN  lacosamide 200 milliGRAM(s) Oral two times a day  nortriptyline 30 milliGRAM(s) Oral at bedtime  ondansetron Injectable 4 milliGRAM(s) IV Push every 6 hours PRN  oxyCODONE    IR 10 milliGRAM(s) Oral every 3 hours PRN  oxyCODONE    IR 5 milliGRAM(s) Oral every 3 hours PRN  topiramate 200 milliGRAM(s) Oral two times a day      A/P :  s/p  Left THR  POD # 1  -    Pain control is reasonable and tolerated with oxycodone  -    DVT ppx: ASA   -    Check AM labs  -    Weight bearing status: WBAT   -    Physical Therapy  -    Dispo: Home vs [facility] ; discharge pending on PT and if medically cleared
POD#:  3  S/P: Left TKR                       SUBJECTIVE: Patient seen and examined.  Up to side of bed with PT and making slow progress with PT/minima ambulation. Tolerating diet. +Voiding.   Reported Pain Score = 7/10 but responds well to pain medication. Tolerating all medications and pain is well controlled on current regimen. Discussed multi-modal pain regimen with patient who expressed full understanding.   Denies: CP/SOB/N/V/Numbness/Tingling    OBJECTIVE:     Vital Signs Last 24 Hrs  T(C): 37.2 (09 Aug 2024 07:23), Max: 37.5 (08 Aug 2024 23:30)  T(F): 98.9 (09 Aug 2024 07:23), Max: 99.5 (08 Aug 2024 23:30)  HR: 73 (09 Aug 2024 07:23) (73 - 93)  BP: 130/83 (09 Aug 2024 07:23) (127/84 - 139/80)  RR: 18 (09 Aug 2024 07:23) (17 - 18)  SpO2: 98% (09 Aug 2024 07:23) (96% - 100%)    Parameters below as of 09 Aug 2024 07:23  Patient On (Oxygen Delivery Method): room air      Gen: AAOx3, NAD  Abd: soft, NT, ND  Left Knee:         Mepilex Dressing: clean/dry/intact  with no erythema or discharge noted  Bilateral LEs:         Sensation:  intact to light touch          Motor exam:  5/5 dorsiflexion/plantarflexion/EHL          2+ DP pulses          calf supple, NT         SCDs in place    LABS:                        8.7    9.07  )-----------( 241      ( 09 Aug 2024 06:00 )             27.3     08-09    141  |  107  |  11  ----------------------------<  98  3.6   |  26  |  0.77    Ca    8.6      09 Aug 2024 06:00        MEDICATIONS:  Anticoagulation:  enoxaparin Injectable 40 milliGRAM(s) SubCutaneous every 12 hours      Pain medications:   acetaminophen     Tablet .. 1000 milliGRAM(s) Oral every 8 hours  celecoxib 200 milliGRAM(s) Oral every 12 hours  HYDROmorphone   Tablet 2 milliGRAM(s) Oral every 3 hours PRN  HYDROmorphone   Tablet 4 milliGRAM(s) Oral every 4 hours PRN  HYDROmorphone  Injectable 0.5 milliGRAM(s) IV Push every 3 hours PRN          A/P :48y Female  stable s/p Left TKR POD # 3  -    Pain control: Acetaminophen, Celebrex, Dilaudid   -    Cryotherapy and elevation of operative extremity to help with pain and swelling  -    DVT ppx:    Lovenox 40 SQ BID x 2 weeks then Aspirin 81mg q 12 h x 2 weeks    and ambulation as tolerated  -    Medicine co-management  -    Weight bearing status: WBAT   -    Physical Therapy  -    Occupational Therapy  -    Discharge plan:    [facility] today pending medical clearance, insurance authorization, bed availability.  
Subjective: Pain controlled now.  Was a little uncomfortable this AM. No other complaints.     MEDICATIONS  (STANDING):  acetaminophen     Tablet .. 1000 milliGRAM(s) Oral every 8 hours  carBAMazepine ER Tablet 400 milliGRAM(s) Oral two times a day  cefadroxil 500 milliGRAM(s) Oral every 12 hours  celecoxib 200 milliGRAM(s) Oral every 12 hours  enoxaparin Injectable 40 milliGRAM(s) SubCutaneous every 12 hours  lacosamide 200 milliGRAM(s) Oral two times a day  lactated ringers. 1000 milliLiter(s) (125 mL/Hr) IV Continuous <Continuous>  nortriptyline 30 milliGRAM(s) Oral at bedtime  pantoprazole    Tablet 40 milliGRAM(s) Oral before breakfast  polyethylene glycol 3350 17 Gram(s) Oral at bedtime  senna 2 Tablet(s) Oral at bedtime  topiramate 200 milliGRAM(s) Oral two times a day    MEDICATIONS  (PRN):  albuterol    90 MICROgram(s) HFA Inhaler 2 Puff(s) Inhalation every 6 hours PRN for shortness of breath and/or wheezing  HYDROmorphone   Tablet 4 milliGRAM(s) Oral every 4 hours PRN Severe Pain (7 - 10)  HYDROmorphone   Tablet 2 milliGRAM(s) Oral every 3 hours PRN Moderate Pain (4 - 6)  HYDROmorphone  Injectable 0.5 milliGRAM(s) IV Push every 3 hours PRN Breakthrough pain  magnesium hydroxide Suspension 30 milliLiter(s) Oral daily PRN Constipation  ondansetron Injectable 4 milliGRAM(s) IV Push every 6 hours PRN Nausea and/or Vomiting      Vital Signs Last 24 Hrs  T(C): 36.7 (07 Aug 2024 08:12), Max: 37.2 (07 Aug 2024 03:20)  T(F): 98 (07 Aug 2024 08:12), Max: 99 (07 Aug 2024 03:20)  HR: 70 (07 Aug 2024 08:12) (56 - 79)  BP: 127/76 (07 Aug 2024 08:12) (102/51 - 141/75)  BP(mean): 83 (07 Aug 2024 03:20) (74 - 92)  RR: 18 (07 Aug 2024 08:12) (11 - 18)  SpO2: 97% (07 Aug 2024 08:12) (96% - 100%)    Parameters below as of 07 Aug 2024 08:12  Patient On (Oxygen Delivery Method): room air        PHYSICAL EXAM:  GENERAL: NAD  HEAD:  Atraumatic, Normocephalic  ENMT: Moist mucous membranes  NECK: Supple, No JVD, Normal thyroid  CHEST/LUNG: Clear to auscultation bilaterally  HEART: Regular rate and rhythm  ABDOMEN: Soft, Nontender, Nondistended  EXTREMITIES:  2+ Peripheral Pulses      LABS:                        9.5    10.46 )-----------( 236      ( 07 Aug 2024 06:30 )             30.2     07 Aug 2024 06:30    136    |  105    |  13     ----------------------------<  112    3.4     |  21     |  0.85     Ca    8.2        07 Aug 2024 06:30    TPro  6.4    /  Alb  2.7    /  TBili  0.2    /  DBili  0.0    /  AST  19     /  ALT  10     /  AlkPhos  84     07 Aug 2024 06:30      Urinalysis Basic - ( 07 Aug 2024 06:30 )    Color: x / Appearance: x / SG: x / pH: x  Gluc: 112 mg/dL / Ketone: x  / Bili: x / Urobili: x   Blood: x / Protein: x / Nitrite: x   Leuk Esterase: x / RBC: x / WBC x   Sq Epi: x / Non Sq Epi: x / Bacteria: x      CAPILLARY BLOOD GLUCOSE            
Subjective: Patient seen and examined. No events overnight. Pain controlled. In Good Spirits.     MEDICATIONS  (STANDING):  acetaminophen     Tablet .. 1000 milliGRAM(s) Oral every 8 hours  amLODIPine   Tablet 5 milliGRAM(s) Oral daily  carBAMazepine ER Tablet 400 milliGRAM(s) Oral two times a day  cefadroxil 500 milliGRAM(s) Oral every 12 hours  celecoxib 200 milliGRAM(s) Oral every 12 hours  enoxaparin Injectable 40 milliGRAM(s) SubCutaneous every 12 hours  lacosamide 200 milliGRAM(s) Oral two times a day  lactated ringers. 1000 milliLiter(s) (125 mL/Hr) IV Continuous <Continuous>  nortriptyline 30 milliGRAM(s) Oral at bedtime  pantoprazole    Tablet 40 milliGRAM(s) Oral before breakfast  polyethylene glycol 3350 17 Gram(s) Oral at bedtime  senna 2 Tablet(s) Oral at bedtime  topiramate 200 milliGRAM(s) Oral two times a day    MEDICATIONS  (PRN):  albuterol    90 MICROgram(s) HFA Inhaler 2 Puff(s) Inhalation every 6 hours PRN for shortness of breath and/or wheezing  HYDROmorphone   Tablet 2 milliGRAM(s) Oral every 3 hours PRN Moderate Pain (4 - 6)  HYDROmorphone   Tablet 4 milliGRAM(s) Oral every 4 hours PRN Severe Pain (7 - 10)  HYDROmorphone  Injectable 0.5 milliGRAM(s) IV Push every 3 hours PRN Breakthrough pain  magnesium hydroxide Suspension 30 milliLiter(s) Oral daily PRN Constipation  ondansetron Injectable 4 milliGRAM(s) IV Push every 6 hours PRN Nausea and/or Vomiting      Vital Signs Last 24 Hrs  T(C): 36.7 (08 Aug 2024 08:12), Max: 37.1 (07 Aug 2024 15:33)  T(F): 98.1 (08 Aug 2024 08:12), Max: 98.7 (07 Aug 2024 15:33)  HR: 79 (08 Aug 2024 08:12) (72 - 90)  BP: 125/83 (08 Aug 2024 08:12) (125/83 - 144/77)  BP(mean): 90 (07 Aug 2024 23:15) (90 - 99)  RR: 16 (08 Aug 2024 08:12) (16 - 18)  SpO2: 96% (08 Aug 2024 08:12) (96% - 97%)    Parameters below as of 07 Aug 2024 23:15  Patient On (Oxygen Delivery Method): room air        PHYSICAL EXAM:  GENERAL: NAD  HEAD:  Atraumatic, Normocephalic  ENMT: Moist mucous membranes  NECK: Supple, No JVD, Normal thyroid  CHEST/LUNG: Clear to auscultation bilaterally  HEART: Regular rate and rhythm  ABDOMEN: Soft, Nontender, Nondistended  EXTREMITIES:  2+ Peripheral Pulses      LABS:                        9.3    9.96  )-----------( 247      ( 08 Aug 2024 06:00 )             28.7     08 Aug 2024 06:00    141    |  108    |  13     ----------------------------<  108    3.0     |  24     |  0.81     Ca    8.5        08 Aug 2024 06:00        Urinalysis Basic - ( 08 Aug 2024 06:00 )    Color: x / Appearance: x / SG: x / pH: x  Gluc: 108 mg/dL / Ketone: x  / Bili: x / Urobili: x   Blood: x / Protein: x / Nitrite: x   Leuk Esterase: x / RBC: x / WBC x   Sq Epi: x / Non Sq Epi: x / Bacteria: x      CAPILLARY BLOOD GLUCOSE            
NICOLE HAYES  [id number]  Pt is a 48y year old Female s/p left TKR. pain is 3/10. (+) Voids, tolerating regular diet. Denies chest pain/shortness of breath/nausea/vomitting.   Vital Signs Last 24 Hrs  T(C): 36.7 (08 Aug 2024 08:12), Max: 37.1 (07 Aug 2024 15:33)  T(F): 98.1 (08 Aug 2024 08:12), Max: 98.7 (07 Aug 2024 15:33)  HR: 79 (08 Aug 2024 08:12) (72 - 90)  BP: 125/83 (08 Aug 2024 08:12) (125/83 - 144/77)  BP(mean): 90 (07 Aug 2024 23:15) (90 - 99)  RR: 16 (08 Aug 2024 08:12) (16 - 18)  SpO2: 96% (08 Aug 2024 08:12) (96% - 97%)    Parameters below as of 07 Aug 2024 23:15  Patient On (Oxygen Delivery Method): room air                               9.3    9.96  )-----------( 247      ( 08 Aug 2024 06:00 )             28.7     08-08    141  |  108  |  13  ----------------------------<  108<H>  3.0<L>   |  24  |  0.81    Ca    8.5      08 Aug 2024 06:00    TPro  6.4  /  Alb  2.7<L>  /  TBili  0.2  /  DBili  0.0  /  AST  19  /  ALT  10  /  AlkPhos  84  08-07        PE:   LLE: Dressing changed, incision clean and dry, no erythema or draininage, ZONIA intact SILT distally, (+2) DP/PT pulses, EHL/FHL/TA intact, Capillary refill < 2 seconds. negative calf tenderness PAS on,    A: 48y year old Female s/p left TKR POD#2    Plan:   -DVT ppx = enoxaparin Injectable 40 milliGRAM(s) SubCutaneous every 12 hours    -PT/OT = OOB  -Pain control   -Medicine to follow   -continue to follow Labs  -continue use of PAS  -Dispo: Rehab discharge pending today/tomorrow 
Subjective: Patient seen and examined. No overnight events. Doing wel and pain control has improved.     MEDICATIONS  (STANDING):  acetaminophen     Tablet .. 1000 milliGRAM(s) Oral every 8 hours  amLODIPine   Tablet 5 milliGRAM(s) Oral daily  carBAMazepine ER Tablet 400 milliGRAM(s) Oral two times a day  cefadroxil 500 milliGRAM(s) Oral every 12 hours  celecoxib 200 milliGRAM(s) Oral every 12 hours  enoxaparin Injectable 40 milliGRAM(s) SubCutaneous every 12 hours  lacosamide 200 milliGRAM(s) Oral two times a day  lactated ringers. 1000 milliLiter(s) (125 mL/Hr) IV Continuous <Continuous>  nortriptyline 30 milliGRAM(s) Oral at bedtime  pantoprazole    Tablet 40 milliGRAM(s) Oral before breakfast  polyethylene glycol 3350 17 Gram(s) Oral at bedtime  senna 2 Tablet(s) Oral at bedtime  topiramate 200 milliGRAM(s) Oral two times a day    MEDICATIONS  (PRN):  albuterol    90 MICROgram(s) HFA Inhaler 2 Puff(s) Inhalation every 6 hours PRN for shortness of breath and/or wheezing  HYDROmorphone   Tablet 4 milliGRAM(s) Oral every 4 hours PRN Severe Pain (7 - 10)  HYDROmorphone   Tablet 2 milliGRAM(s) Oral every 3 hours PRN Moderate Pain (4 - 6)  HYDROmorphone  Injectable 0.5 milliGRAM(s) IV Push every 3 hours PRN Breakthrough pain  magnesium hydroxide Suspension 30 milliLiter(s) Oral daily PRN Constipation  ondansetron Injectable 4 milliGRAM(s) IV Push every 6 hours PRN Nausea and/or Vomiting      Vital Signs Last 24 Hrs  T(C): 37.2 (09 Aug 2024 07:23), Max: 37.5 (08 Aug 2024 23:30)  T(F): 98.9 (09 Aug 2024 07:23), Max: 99.5 (08 Aug 2024 23:30)  HR: 73 (09 Aug 2024 07:23) (73 - 93)  BP: 130/83 (09 Aug 2024 07:23) (127/84 - 139/80)  BP(mean): --  RR: 18 (09 Aug 2024 07:23) (17 - 18)  SpO2: 98% (09 Aug 2024 07:23) (96% - 100%)    Parameters below as of 09 Aug 2024 07:23  Patient On (Oxygen Delivery Method): room air        PHYSICAL EXAM:  GENERAL: NAD  HEAD:  Atraumatic, Normocephalic  ENMT: Moist mucous membranes  NECK: Supple, No JVD, Normal thyroid  CHEST/LUNG: Clear to auscultation bilaterally  HEART: Regular rate and rhythm  ABDOMEN: Soft, Nontender, Nondistended  EXTREMITIES:  2+ Peripheral Pulses      LABS:                        8.7    9.07  )-----------( 241      ( 09 Aug 2024 06:00 )             27.3     09 Aug 2024 06:00    141    |  107    |  11     ----------------------------<  98     3.6     |  26     |  0.77     Ca    8.6        09 Aug 2024 06:00        Urinalysis Basic - ( 09 Aug 2024 06:00 )    Color: x / Appearance: x / SG: x / pH: x  Gluc: 98 mg/dL / Ketone: x  / Bili: x / Urobili: x   Blood: x / Protein: x / Nitrite: x   Leuk Esterase: x / RBC: x / WBC x   Sq Epi: x / Non Sq Epi: x / Bacteria: x      CAPILLARY BLOOD GLUCOSE

## 2024-08-09 NOTE — DISCHARGE NOTE NURSING/CASE MANAGEMENT/SOCIAL WORK - PATIENT PORTAL LINK FT
You can access the FollowMyHealth Patient Portal offered by [facility] by registering at the following website: [email or web address]. By joining [facility]’s FollowMyHealth portal, you will also be able to view your health information using other applications (apps) compatible with our system.

## 2024-08-09 NOTE — DIETITIAN INITIAL EVALUATION ADULT - ADD RECOMMEND
encourage PO intake  reinforce continuation of healthy life style (healthy food choices, resuming physical activity as medically feasible)

## 2024-08-09 NOTE — DIETITIAN INITIAL EVALUATION ADULT - ORAL INTAKE PTA/DIET HISTORY
patient does not give full food recall, reports generally consuming breakfast and dinner, and if she snacks it is fresh fruit during the day between meals.

## 2024-08-09 NOTE — SOCIAL WORK PROGRESS NOTE - NSSWPROGRESSNOTE_GEN_ALL_CORE
Pt is scheduled for DC to [facility] at 4pm today via ambulette. Auth# for ambulette is [id number]. Bariatric wheelchair with elevated leg rests ordered. Pt, Tx team and [facility] notified.

## 2024-08-09 NOTE — DIETITIAN INITIAL EVALUATION ADULT - PERTINENT MEDS FT
MEDICATIONS  (STANDING):  acetaminophen     Tablet .. 1000 milliGRAM(s) Oral every 8 hours  amLODIPine   Tablet 5 milliGRAM(s) Oral daily  carBAMazepine ER Tablet 400 milliGRAM(s) Oral two times a day  cefadroxil 500 milliGRAM(s) Oral every 12 hours  celecoxib 200 milliGRAM(s) Oral every 12 hours  enoxaparin Injectable 40 milliGRAM(s) SubCutaneous every 12 hours  lacosamide 200 milliGRAM(s) Oral two times a day  lactated ringers. 1000 milliLiter(s) (125 mL/Hr) IV Continuous <Continuous>  nortriptyline 30 milliGRAM(s) Oral at bedtime  pantoprazole    Tablet 40 milliGRAM(s) Oral before breakfast  polyethylene glycol 3350 17 Gram(s) Oral at bedtime  senna 2 Tablet(s) Oral at bedtime  topiramate 200 milliGRAM(s) Oral two times a day    MEDICATIONS  (PRN):  albuterol    90 MICROgram(s) HFA Inhaler 2 Puff(s) Inhalation every 6 hours PRN for shortness of breath and/or wheezing  HYDROmorphone   Tablet 4 milliGRAM(s) Oral every 4 hours PRN Severe Pain (7 - 10)  HYDROmorphone   Tablet 2 milliGRAM(s) Oral every 3 hours PRN Moderate Pain (4 - 6)  HYDROmorphone  Injectable 0.5 milliGRAM(s) IV Push every 3 hours PRN Breakthrough pain  magnesium hydroxide Suspension 30 milliLiter(s) Oral daily PRN Constipation  ondansetron Injectable 4 milliGRAM(s) IV Push every 6 hours PRN Nausea and/or Vomiting

## 2024-08-16 ENCOUNTER — APPOINTMENT (OUTPATIENT)
Dept: ORTHOPEDIC SURGERY | Facility: CLINIC | Age: 49
End: 2024-08-16
Payer: MEDICAID

## 2024-08-16 DIAGNOSIS — Z96.652 PRESENCE OF LEFT ARTIFICIAL KNEE JOINT: ICD-10-CM

## 2024-08-16 PROCEDURE — 73560 X-RAY EXAM OF KNEE 1 OR 2: CPT | Mod: LT

## 2024-08-16 PROCEDURE — 99024 POSTOP FOLLOW-UP VISIT: CPT

## 2024-08-16 RX ORDER — OMEPRAZOLE 20 MG/1
20 CAPSULE, DELAYED RELEASE ORAL
Refills: 0 | Status: ACTIVE | COMMUNITY

## 2024-08-16 RX ORDER — CARBAMAZEPINE 200 MG/1
TABLET ORAL
Refills: 0 | Status: ACTIVE | COMMUNITY

## 2024-08-16 RX ORDER — CELECOXIB 200 MG/1
200 CAPSULE ORAL
Refills: 0 | Status: ACTIVE | COMMUNITY

## 2024-08-16 RX ORDER — CHLORHEXIDINE GLUCONATE 4 %
325 (65 FE) LIQUID (ML) TOPICAL
Refills: 0 | Status: ACTIVE | COMMUNITY

## 2024-08-16 RX ORDER — AMLODIPINE BESYLATE 5 MG/1
5 TABLET ORAL
Refills: 0 | Status: ACTIVE | COMMUNITY

## 2024-08-16 RX ORDER — LACOSAMIDE 200 MG/1
200 TABLET, FILM COATED ORAL
Refills: 0 | Status: ACTIVE | COMMUNITY

## 2024-08-16 RX ORDER — ACETAMINOPHEN 500 MG/1
500 TABLET ORAL
Refills: 0 | Status: ACTIVE | COMMUNITY

## 2024-08-16 RX ORDER — CEFADROXIL 500 MG/1
500 CAPSULE ORAL
Refills: 0 | Status: ACTIVE | COMMUNITY

## 2024-08-16 RX ORDER — ALBUTEROL 90 MCG
90 AEROSOL (GRAM) INHALATION
Refills: 0 | Status: ACTIVE | COMMUNITY

## 2024-08-16 RX ORDER — SENNOSIDES 8.6 MG TABLETS 8.6 MG/1
8.6 TABLET ORAL
Refills: 0 | Status: ACTIVE | COMMUNITY

## 2024-09-18 ENCOUNTER — APPOINTMENT (OUTPATIENT)
Dept: ORTHOPEDIC SURGERY | Facility: CLINIC | Age: 49
End: 2024-09-18
Payer: MEDICAID

## 2024-09-18 VITALS — BODY MASS INDEX: 43.55 KG/M2 | HEIGHT: 66 IN | WEIGHT: 271 LBS

## 2024-09-18 DIAGNOSIS — Z96.652 PRESENCE OF LEFT ARTIFICIAL KNEE JOINT: ICD-10-CM

## 2024-09-18 PROCEDURE — 99024 POSTOP FOLLOW-UP VISIT: CPT

## 2024-09-19 ENCOUNTER — APPOINTMENT (OUTPATIENT)
Dept: ORTHOPEDIC SURGERY | Facility: CLINIC | Age: 49
End: 2024-09-19

## 2024-10-04 ENCOUNTER — APPOINTMENT (OUTPATIENT)
Dept: ORTHOPEDIC SURGERY | Facility: CLINIC | Age: 49
End: 2024-10-04
Payer: MEDICAID

## 2024-10-04 VITALS — BODY MASS INDEX: 43.55 KG/M2 | HEIGHT: 66 IN | WEIGHT: 271 LBS

## 2024-10-04 DIAGNOSIS — Z96.652 PRESENCE OF LEFT ARTIFICIAL KNEE JOINT: ICD-10-CM

## 2024-10-04 PROCEDURE — 73562 X-RAY EXAM OF KNEE 3: CPT | Mod: LT

## 2024-10-04 PROCEDURE — 99024 POSTOP FOLLOW-UP VISIT: CPT

## 2024-11-06 ENCOUNTER — APPOINTMENT (OUTPATIENT)
Dept: ORTHOPEDIC SURGERY | Facility: CLINIC | Age: 49
End: 2024-11-06

## 2025-01-30 NOTE — PATIENT PROFILE ADULT - LIVING ENVIRONMENT
no Continue Regimen: Restart Tretinoin 0.05 she already has to the shoulders daily at night\\n\\nClindamycin to clean face daily . Plan: IN RESERVE : Skinv Tretinoin \\n\\n2 month F/u Detail Level: Zone Render In Strict Bullet Format?: No Initiate Treatment: clobetasol 0.05 % scalp solution Apply to affected areas of the scalp 1-2 times a day for 2 weeks , stop for 1 week .Reapply if needed

## 2025-07-07 NOTE — DIETITIAN INITIAL EVALUATION ADULT - WEIGHT (KG)
Social Work Note  7/7/2025  faxed [facility] [phone]) patient's next scheduled appointment.  ELSIE Burger, W [phone]     76.2

## 2025-07-23 ENCOUNTER — NON-APPOINTMENT (OUTPATIENT)
Age: 50
End: 2025-07-23

## 2025-08-27 ENCOUNTER — NON-APPOINTMENT (OUTPATIENT)
Age: 50
End: 2025-08-27

## (undated) DEVICE — ROSA NAVITRACKER KIT KNEE/SPINE

## (undated) DEVICE — SAW BLADE STRYKER SAGITTAL AGGRESSIVE 12.5X79X1.24MM

## (undated) DEVICE — WARMING BLANKET UPPER ADULT

## (undated) DEVICE — SOL IRR POUR NS 0.9% 1000ML

## (undated) DEVICE — CRYO/CUFF GRAVITY COOLER KNEE LARGE

## (undated) DEVICE — ELCTR AQUAMANTYS BIPOLAR SEALER 6.0

## (undated) DEVICE — ELCTR STRYKER NEPTUNE SMOKE EVACUATION PENCIL (GREEN)

## (undated) DEVICE — NDL HYPO SAFE 18G X 1.5" (PINK)

## (undated) DEVICE — BRACE KNEE IMMOBILIZER SPLINT SUPER LARGE 20"

## (undated) DEVICE — WOUND IRR IRRISEPT W 0.5 CHG

## (undated) DEVICE — SUT POLYSORB 2-0 30" GS-11 UNDYED

## (undated) DEVICE — DRSG COMBINE 5X9"

## (undated) DEVICE — SYR LUER LOK 50CC

## (undated) DEVICE — BLADE SCALPEL SAFETYLOCK #11

## (undated) DEVICE — SUT MONOSOF 3-0 18" P-12

## (undated) DEVICE — PACK TOTAL KNEE

## (undated) DEVICE — SUT MONOCRYL 3-0 27" PS-2 UNDYED

## (undated) DEVICE — NDL SPINAL 18G X 3.5" (PINK)

## (undated) DEVICE — GLV 7.5 PROTEXIS (WHITE)

## (undated) DEVICE — SUT MAXON 1 27" T-12

## (undated) DEVICE — SUT POLYSORB 1 27" GS-12 UNDYED

## (undated) DEVICE — DRAPE TOP SHEET 53" X 101"

## (undated) DEVICE — HOOD T5 PEELAWAY

## (undated) DEVICE — VENODYNE/SCD SLEEVE CALF MEDIUM

## (undated) DEVICE — HOOD FLYTE STRYKER HELMET SHIELD

## (undated) DEVICE — DRAPE 3/4 SHEET 52X76"

## (undated) DEVICE — SUT PDS II 1 27" CT

## (undated) DEVICE — SOL IRR POUR H2O 1000ML

## (undated) DEVICE — SUT POLYSORB 0 36" GS-11 UNDYED

## (undated) DEVICE — SAW BLADE STRYKER SAGITTAL 3 HOLE OSCILLATING

## (undated) DEVICE — HANDPIECE INTERPULSE W MULTI TIP

## (undated) DEVICE — GLV 8 PROTEXIS (BLUE)

## (undated) DEVICE — SUT MONOSOF 3-0 18" C-14

## (undated) DEVICE — SOL IRR BAG NS 0.9% 3000ML

## (undated) DEVICE — DRSG DERMABOND PRINEO 22CM

## (undated) DEVICE — FOLEY TRAY 16FR SURESTEP LTX BG

## (undated) DEVICE — DRAPE INSTRUMENT POUCH 6.75" X 11"